# Patient Record
(demographics unavailable — no encounter records)

---

## 2024-12-08 NOTE — PHYSICAL EXAM
[Fully active, able to carry on all pre-disease performance without restriction] : Status 0 - Fully active, able to carry on all pre-disease performance without restriction [Normal] : affect appropriate [de-identified] : patient declines breast exam today

## 2024-12-08 NOTE — HISTORY OF PRESENT ILLNESS
[de-identified] : Patient is a 53 year old female here today for initial consultation of breast cancer, initially diagnosed 2019. Left upper outer quadrant lumpectomy conducted at [facility] in [place] and surgical pathology dated 11/15/2019 shows invasive lobular carcinoma of left breast, histologic grade II, tumor 1.1 mm - unifocal, no DCIS, no lymph node involvement. ER >95%, VA > 95%, HER2 negative, Ki67 <5%. MRI conducted on 2019 showed two areas of enhancement - one irregularly marginated and suspicious. Patient seen by Dr. Heath. Left breast US JESSICA seed placement done on 2020. Seen on 2020 for erythema and tenderness at site s/p seed placement, concerns for infection. Patient prescribed cephalexin, stopped taking on 2020. Underwent a secondary lumpectomy and sentinel lymph node biopsy on 2020 - 3 lymph nodes negative.  : 5 Para: 3 Age of onset of menarche: 12 LMP: 2019 oral contraceptive use from 5420-4986, denies HRT  Last bone density scan: denies  father with melanoma otherwise no family history of oncologic or hematologic disorders.  Patient has a smoking history since age 16, on and off, 15 year gap from 6912-7745. Current smoker 3-5 cigarettes/day. Drinks alcohol moderately 2-3 times/week. [de-identified] : Patient seen and examined today for routine follow up for the management of breast cancer. She remains on Zoladex and exemestane. She notes hotflashes and decreased libido with Exemestane use. Her symptoms have remained the same. She is due for another foot surgery but does not have scheduled yet. Follows with psych and added wellbutrin. Notes brain fog and sometimes blurry vision.   Mammo/Sono: 3/2024 BIRADS 2 due 3/2025 CNY: due 2026  MRI/MRCP 9/2023 Stable 4 mm pancreatic cystic lesion since 05/10/2021.Continued surveillance imaging recommended with follow-up MRI/MRCP in 2 years. DEXA: 12/2022 due 12/2024 CT Chest LungRads 2 stable 3mm nodule small hiatal hernia and esophageal wall thickening  MRI L spine No evidence of osseous metastasis in the lumbar spine.Grade 1 anterolisthesis L4-5 with resultant moderate spinal canal stenosis.

## 2024-12-08 NOTE — PHYSICAL EXAM
[Fully active, able to carry on all pre-disease performance without restriction] : Status 0 - Fully active, able to carry on all pre-disease performance without restriction [Normal] : affect appropriate [de-identified] : patient declines breast exam today

## 2024-12-08 NOTE — ASSESSMENT
[FreeTextEntry1] : #Breast Cancer - ILC of L breast ER+ (95%)/ FL+ (95%), HER2-, Ki 67 <5% s/p lumpectomy and SLN biopsy (T1b, N0, Mx) - Stage IA. - We reviewed the diagnosis, prognosis, and natural history of ILC, ER+ (95%)/ FL+ (95%), HER2- - We have reviewed her previous pathology and radiology reports. - We have reviewed the NCCN guidelines and patient expresses understanding and would like to proceed with the below plan. - Genetics - VUS PALB2. - She is s/p lumpectomy. - Oncotype 17 - At time she was 50 yo, Discussed that there is ~1.6% benefit of adding chemo in addition to endocrine therapy. Decided not to pursue chemo - s/p RT - Ovarian suppression with zoladex 3.6 mg monthly. - FSH/LH and estradiol reviewed - ok for exemestane - On exemestane 25 mg PO daily (started 6/2020) take this with Ca++ 1200 mg PO q day and Vit D 800 IU daily to protect her bone health. - Weaned off bupropiron supposedly. Follow up Dr. Brandan Swenson of [facility] to discuss medications and support. [phone]. If psychiatric health is compromised due to zoladex and exemestane can try tamoxifen. - 11/6/2020 - mammo - new architectural changes in the L breast cw breast conservation therapy. repeat diagnostic mammo/sono needed in 6 months (5/2021) No mammo or sonographic findings suspicious for malignancy in either breast - Does not want oopherectomy - DEXA - L spine T score 0.3, Hip T score 0.3, Femoral neck T score = -0.4 - 5/3/2021 - mammo reviewed - post treatment changes in L breast no new or recurrent malignancy of left breast. Bilateral diagnostic mammo 11/2021 - 8/9/2021- continues on zoladex and exemestane. vs and labs reviewed - 9/21/2021 - continues on zoladex and exemestane. vs and labs reviewed. remains premenopausal. bilateral diagnostic mammo/sono due for November. Names given for Dr. Mcpherson. - 10/18/2021 - vs and labs reviewed. continues on zoladex and exemestane. due for mammo/us in Nov - 11/16/2021 continue zoladex and exemestane, ammo/US scheduled 11/23/2021 - 12/14/2021 continue zoladex and exemestane, mammo/US reviewed- no suspicious findings, repeat 12 mos - 1/12/2022 - vs and labs reviewed. zoladex today. continue exemestane, 11/2022 - due for mammo/sono again. needs to follow up with Dr. Ramirez/Rojas. PE benign today - 3/16/2022 vs and labs reviewed, zoladex today. conitnue exemestane - 4/25/22 - reviewed hospitalization course with patient. vs and labs reviewed. zoladex today. will resume exemestane - held since 4/4. Will scan CT C/A/P to assess cancer status given new diagnosis of PEs. November 2022 - 5/24/2022 vs and labs reviewed; pending CT C (angio) A/P and NM - 6/21/2022 vs and labs reviewed; Dr. Spangler's note reviewed; NM scheduled for 6/24/2022 - 7/19/2022 vs and CBC reviewed; continue exemestane + monthly zoladex Recent imaging Reviewed: CTA- no discrete intrinsic filling defect is identified within the main right or left, lobar or proximal segmental pulmonary arteries to suggest central pulmonary emboli CT A/P- no evidence of suspicious mass or metastatic disease within the abdomen or pelvis NM- no scintigraphic evidence of bony metastatic disease - 8/17/22 - vs reviewed. labs drawn in office today. cbc wnl including wbc, hgb, plt. Had fall and oozing from staple site. will decrease eliquis to 2.5 mg BID temporarily for 2 weeks. She has been on treatment dose since 4/2022. She is not getting her period however the most recent FSH and LH are not in postmenopausal range. Estradiol <5. Will discuss with GYN. for now will continue with zoledex and exemestane. Tamoxifen is not a good option given history of blood clots. - 9/19/2022 vs and CBC reviewed, continue exemestane; mammo/US ordered; DEXA ordered-due 12/2022 - 10/17/2022 vs and CBC reviewed; WBC 4.10, hgb 13.8, 329; mammo and DEXA scheduled 12/6/2022 - 11/14/2022 vs and CBC reviewed; WBC 5.6, hgb 14, plt 320; continue exemestane + zoladex hormones last 10/17 estradiol 7, FSH 8.5, LH 0.4 - 12/12/22 - vs reviewed. labs drawn in office today. cbc within normal limits, cmp wnl. continue exemestane and zoladex. Reviewed mammo/us - no evidence of malignancy. DEXA 12/22 - reviewed. Osteopenia of hip. continue ca++ and vit D - 1/9/2023 vs and CBC reviewed; WBC 5.9, hgb 13.6, plt 323 continue exemestane + monthly zoladex injections; hormones last est <5, FSH 9.2, LH < 0.3 discussion about potentially trialing a different AI due to patient's symptoms - 2/6/2023 vs and CBC reviewed; WBC 4.2, hgb 13.6, plt 319; proceed with zoladex and continue PO exemestane - 3/6/2023 - vs reviewed. labs drawn in office today. wbc, hgb and plts wnl. continue with zoladex and exemestane, mammo from 12/22 reviewed. need repeat mammo/sono - 12/23 - 5/30/23 - vs reviewed. labs drawn in office today. wbc, hgb and plts wnl. continue with zoladex and exemestane, patient will decide over next few months if she wants to change regimen or pursue oophorectomy, need repeat mammo/sono - 12/23 - 8/28/23 vs and CBC reviewed; WBC 4.80, hgb 13.3, plt 337. Repeat hormones today. Continue exemestane and Zoladex monthly. Deciding about oopherectomy. Due for mammo/sono 12/2023, will order at next visit. - 11/30/23 vs and CBC reviewed; WBC 4.07, hgb 13.9, plt 369. Hormones are in postmenopausal range with slight fluctutations. Continue exemestane and Zoldex. Reviewed that she has been on Zoladex about 3 years with standard 2-5 years. If we trial off would need to monitor hormones. Does not want oopherectomy at this time. proceed with Zoladex today for now.  - 3/6/24 vs and CBC reviewed; WBC 4.57, hgb 13.9, plt 328. Continue Zoladex (overdue) and exemestane. Still thinking about oopherectomy. Zoladex for 2-5 years she has been on for about  3 years, can consider monitoring hormone levels off it, but will need to come monthly to check estradiol, FSH LH and if spike would restart. Mammo/sono 3/4/24 reviewed BIRADS 2 - 5/24 - vs and labs reviewed. labs drawn in office today. cbc wnl. continue zoladex and exemestane (will stop 6/2025). complains of back pain - will check MRI of L spine given discomfort also down legs. Mammo/sono 3/25.  - 9/2024 vs and labs reviewed; continue zoladex and exemestane. Did not go for L spine MRI. Will get scheduled today. Patient asked about estroven for post menoapausal s/s and losing weight. REviewed main ingredient is black cohash and recommend against due to estrogenergic effects. Offered weight management program  - 11/2024 vs and labs reviewed; continue zoladex and exemestane. Mammo due 3/2025notes some brain fog nad intermitent changes to vision - rec optho. Can be due to AI, holding vs monitor vs MRI   #Fatigue  - check irons b12 tfts   #back pain - Check MRI L spine - s/p MRI revealing No evidence of osseous metastasis in the lumbar spine.Grade 1 anterolisthesis L4-5 with resultant moderate spinal canal stenosis. can follow with ortho   #Hot Flashes - On Effexor  #Joint pains - Likely 2/2 zoladex. This may worsen with AI. - Given she has been on Zoladex for 3 years and standard is 2-5 years, can trial off and monitor hormones. Will proceed with Zoladex today and at next visits will discuss holding and monitoring hormones   #Neuropathy - Offered gabapentin - does not want to take at this time. can be exemestane in <1% of patients.  #Pancreatic Cysts - Two small up to 4 mm cysts in the pancreas of unlikely significance, due for 2 yr f/u MRI /MRCP to document stability - s/p follow up with GI Dr. Walker - s/p MRI/MRCP 9/2023 revealing stable 4 mm pancreatic cystic lesion since 05/10/2021.Continued surveillance imaging recommended with follow-up MRI/MRCP in 2 years. 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 due 9/2025 - Mammo/Sono 3/2024 - Reminded to follow up with GYN - EGD/[facility] 2021 with Dr. Walker, due 2026 - of note she had LDCT revealing hiatal hernia and esophageal wall thickeneing - rec follow up with GI may need EGD  - Stil thinking about oopherectomy. For now continuing Zoladex and will think about d/c and monitor hormone levels   #Mental Health - Seeing psychiatry - Remains effexor, adderall, xanax PRN  #Weight gain - 5/26/23 has remained fairly stable over past few visits.  #Chronic Cough - Smoking history - Follows with pulm Dr. Salamanca and Dr. Dallas, last seen 8/2/23 - s/p LDCT 8/2023 reviewed since June 24, 2022; No new or enlarging pulmonary nodules. - due now ordered 9/2024 - LungRads 2 - stable 3mm nodule   #PE - Unprovoked - Elevated cardiolipin and LA - Tolerating Eliquis 5mg BID  - Repeat hypercoag work up 2/2023  - 1/9/2023 hypercoag work up pending - 2/6/2023 LA positive with silica slcotting time 1.4, cardiolipins remain elevated, patient to continue on eliquis - Phone conversation with Dr. Guidry Podiatrist (T [phone] F [phone]) regarding patient's upcoming foot surgery, the patient is cleared to proceed with upcoming procedure. She will require bridging of Lovenox prior to the procedure. Plan for 1 injection Lovenox 80mg twice daily Tuesday 2/7, Wednesday 2/8, and Thursday 2/9; no lovenox injection or eliquis Friday 2/10 the day of procedure. The patient can resume eliquis 5mg PO BID Saturday 2/11/2023 - Continue Eliquis 5mg BID - 11/30/23 patient sought out another opinion with surgeon in [place] she does not recall the name of the surgeon and will get me this information. She is scheduled for L achiles tendon debridement and repair of calcenael Ex ostectomy and gastroc resection as needed, felxor hallicus longus tendon transfer. patient states this is ambulatory. She will require bridging of Lovenox prior to the procedure. Plan for 1 injection Lovenox 80mg twice daily Monday 12/18, Tuesday 12/19, and morning of Weds 12/20 no lovenox injection or eliquis Thursday 12/21 the day of procedure. The patient can resume eliquis 5mg PO BID Friday 12/22/2023 - 5/24 - need foot surgery again after labor day - will transition to lovenox prior to procedure - need another foot surgery, not scheduled advisesd to call when she knows   #Vertebral Artery dissection - Follows with [facility] at [facility], has follow up 9/2022 with Dr. Mcgee - Multiple requests made for medical records  #Orthopedic Surgery - Has ongoing pain to the b/l feet and ankle s/p previous tendon surgery. Pending another surgery/second opinion - As above recommendations for Lovenox  - She is s/p recent achilles surgery with Dr. Horton at [facility] - will need another surgery to R foot not scheduled   #Health Maintenance  - Mammo/Sono: 3/2024 BIRADS 2 due 3/2025  - CNY: due 2026  - MRI/MRCP 9/2023 Stable 4 mm pancreatic cystic lesion since 05/10/2021.Continued surveillance imaging recommended with follow-up MRI/MRCP in 2 years. Due 9/2025 - DEXA: 12/2022, due 12/2024 ordered  - CT Chest 2024 LungRads 2   LAB in 4 weeks with possible Zoladex LAB in 8 weeks with possible Zoladex RTC in 12 weeks with CBC with diff, CMP, vit D, ESR/CRP, Mag, estradiol, LH, FSH.

## 2024-12-08 NOTE — ASSESSMENT
[FreeTextEntry1] : #Breast Cancer - ILC of L breast ER+ (95%)/ LA+ (95%), HER2-, Ki 67 <5% s/p lumpectomy and SLN biopsy (T1b, N0, Mx) - Stage IA. - We reviewed the diagnosis, prognosis, and natural history of ILC, ER+ (95%)/ LA+ (95%), HER2- - We have reviewed her previous pathology and radiology reports. - We have reviewed the NCCN guidelines and patient expresses understanding and would like to proceed with the below plan. - Genetics - VUS PALB2. - She is s/p lumpectomy. - Oncotype 17 - At time she was 50 yo, Discussed that there is ~1.6% benefit of adding chemo in addition to endocrine therapy. Decided not to pursue chemo - s/p RT - Ovarian suppression with zoladex 3.6 mg monthly. - FSH/LH and estradiol reviewed - ok for exemestane - On exemestane 25 mg PO daily (started 6/2020) take this with Ca++ 1200 mg PO q day and Vit D 800 IU daily to protect her bone health. - Weaned off bupropiron supposedly. Follow up Dr. Brandan Swenson of [facility] to discuss medications and support. [phone]. If psychiatric health is compromised due to zoladex and exemestane can try tamoxifen. - 11/6/2020 - mammo - new architectural changes in the L breast cw breast conservation therapy. repeat diagnostic mammo/sono needed in 6 months (5/2021) No mammo or sonographic findings suspicious for malignancy in either breast - Does not want oopherectomy - DEXA - L spine T score 0.3, Hip T score 0.3, Femoral neck T score = -0.4 - 5/3/2021 - mammo reviewed - post treatment changes in L breast no new or recurrent malignancy of left breast. Bilateral diagnostic mammo 11/2021 - 8/9/2021- continues on zoladex and exemestane. vs and labs reviewed - 9/21/2021 - continues on zoladex and exemestane. vs and labs reviewed. remains premenopausal. bilateral diagnostic mammo/sono due for November. Names given for Dr. Mcpherson. - 10/18/2021 - vs and labs reviewed. continues on zoladex and exemestane. due for mammo/us in Nov - 11/16/2021 continue zoladex and exemestane, ammo/US scheduled 11/23/2021 - 12/14/2021 continue zoladex and exemestane, mammo/US reviewed- no suspicious findings, repeat 12 mos - 1/12/2022 - vs and labs reviewed. zoladex today. continue exemestane, 11/2022 - due for mammo/sono again. needs to follow up with Dr. Ramirez/Rojas. PE benign today - 3/16/2022 vs and labs reviewed, zoladex today. conitnue exemestane - 4/25/22 - reviewed hospitalization course with patient. vs and labs reviewed. zoladex today. will resume exemestane - held since 4/4. Will scan CT C/A/P to assess cancer status given new diagnosis of PEs. November 2022 - 5/24/2022 vs and labs reviewed; pending CT C (angio) A/P and NM - 6/21/2022 vs and labs reviewed; Dr. Spangler's note reviewed; NM scheduled for 6/24/2022 - 7/19/2022 vs and CBC reviewed; continue exemestane + monthly zoladex Recent imaging Reviewed: CTA- no discrete intrinsic filling defect is identified within the main right or left, lobar or proximal segmental pulmonary arteries to suggest central pulmonary emboli CT A/P- no evidence of suspicious mass or metastatic disease within the abdomen or pelvis NM- no scintigraphic evidence of bony metastatic disease - 8/17/22 - vs reviewed. labs drawn in office today. cbc wnl including wbc, hgb, plt. Had fall and oozing from staple site. will decrease eliquis to 2.5 mg BID temporarily for 2 weeks. She has been on treatment dose since 4/2022. She is not getting her period however the most recent FSH and LH are not in postmenopausal range. Estradiol <5. Will discuss with GYN. for now will continue with zoledex and exemestane. Tamoxifen is not a good option given history of blood clots. - 9/19/2022 vs and CBC reviewed, continue exemestane; mammo/US ordered; DEXA ordered-due 12/2022 - 10/17/2022 vs and CBC reviewed; WBC 4.10, hgb 13.8, 329; mammo and DEXA scheduled 12/6/2022 - 11/14/2022 vs and CBC reviewed; WBC 5.6, hgb 14, plt 320; continue exemestane + zoladex hormones last 10/17 estradiol 7, FSH 8.5, LH 0.4 - 12/12/22 - vs reviewed. labs drawn in office today. cbc within normal limits, cmp wnl. continue exemestane and zoladex. Reviewed mammo/us - no evidence of malignancy. DEXA 12/22 - reviewed. Osteopenia of hip. continue ca++ and vit D - 1/9/2023 vs and CBC reviewed; WBC 5.9, hgb 13.6, plt 323 continue exemestane + monthly zoladex injections; hormones last est <5, FSH 9.2, LH < 0.3 discussion about potentially trialing a different AI due to patient's symptoms - 2/6/2023 vs and CBC reviewed; WBC 4.2, hgb 13.6, plt 319; proceed with zoladex and continue PO exemestane - 3/6/2023 - vs reviewed. labs drawn in office today. wbc, hgb and plts wnl. continue with zoladex and exemestane, mammo from 12/22 reviewed. need repeat mammo/sono - 12/23 - 5/30/23 - vs reviewed. labs drawn in office today. wbc, hgb and plts wnl. continue with zoladex and exemestane, patient will decide over next few months if she wants to change regimen or pursue oophorectomy, need repeat mammo/sono - 12/23 - 8/28/23 vs and CBC reviewed; WBC 4.80, hgb 13.3, plt 337. Repeat hormones today. Continue exemestane and Zoladex monthly. Deciding about oopherectomy. Due for mammo/sono 12/2023, will order at next visit. - 11/30/23 vs and CBC reviewed; WBC 4.07, hgb 13.9, plt 369. Hormones are in postmenopausal range with slight fluctutations. Continue exemestane and Zoldex. Reviewed that she has been on Zoladex about 3 years with standard 2-5 years. If we trial off would need to monitor hormones. Does not want oopherectomy at this time. proceed with Zoladex today for now.  - 3/6/24 vs and CBC reviewed; WBC 4.57, hgb 13.9, plt 328. Continue Zoladex (overdue) and exemestane. Still thinking about oopherectomy. Zoladex for 2-5 years she has been on for about  3 years, can consider monitoring hormone levels off it, but will need to come monthly to check estradiol, FSH LH and if spike would restart. Mammo/sono 3/4/24 reviewed BIRADS 2 - 5/24 - vs and labs reviewed. labs drawn in office today. cbc wnl. continue zoladex and exemestane (will stop 6/2025). complains of back pain - will check MRI of L spine given discomfort also down legs. Mammo/sono 3/25.  - 9/2024 vs and labs reviewed; continue zoladex and exemestane. Did not go for L spine MRI. Will get scheduled today. Patient asked about estroven for post menoapausal s/s and losing weight. REviewed main ingredient is black cohash and recommend against due to estrogenergic effects. Offered weight management program  - 11/2024 vs and labs reviewed; continue zoladex and exemestane. Mammo due 3/2025notes some brain fog nad intermitent changes to vision - rec optho. Can be due to AI, holding vs monitor vs MRI   #Fatigue  - check irons b12 tfts   #back pain - Check MRI L spine - s/p MRI revealing No evidence of osseous metastasis in the lumbar spine.Grade 1 anterolisthesis L4-5 with resultant moderate spinal canal stenosis. can follow with ortho   #Hot Flashes - On Effexor  #Joint pains - Likely 2/2 zoladex. This may worsen with AI. - Given she has been on Zoladex for 3 years and standard is 2-5 years, can trial off and monitor hormones. Will proceed with Zoladex today and at next visits will discuss holding and monitoring hormones   #Neuropathy - Offered gabapentin - does not want to take at this time. can be exemestane in <1% of patients.  #Pancreatic Cysts - Two small up to 4 mm cysts in the pancreas of unlikely significance, due for 2 yr f/u MRI /MRCP to document stability - s/p follow up with GI Dr. Walker - s/p MRI/MRCP 9/2023 revealing stable 4 mm pancreatic cystic lesion since 05/10/2021.Continued surveillance imaging recommended with follow-up MRI/MRCP in 2 years. 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 due 9/2025 - Mammo/Sono 3/2024 - Reminded to follow up with GYN - EGD/CNY 2021 with Dr. Walker, due 2026 - of note she had LDCT revealing hiatal hernia and esophageal wall thickeneing - rec follow up with GI may need EGD  - Stil thinking about oopherectomy. For now continuing Zoladex and will think about d/c and monitor hormone levels   #Mental Health - Seeing psychiatry - Remains effexor, adderall, xanax PRN  #Weight gain - 5/26/23 has remained fairly stable over past few visits.  #Chronic Cough - Smoking history - Follows with pulm Dr. Salamanca and Dr. Dallas, last seen 8/2/23 - s/p LDCT 8/2023 reviewed since June 24, 2022; No new or enlarging pulmonary nodules. - due now ordered 9/2024 - LungRads 2 - stable 3mm nodule   #PE - Unprovoked - Elevated cardiolipin and LA - Tolerating Eliquis 5mg BID  - Repeat hypercoag work up 2/2023  - 1/9/2023 hypercoag work up pending - 2/6/2023 LA positive with silica slcotting time 1.4, cardiolipins remain elevated, patient to continue on eliquis - Phone conversation with Dr. Guidry Podiatrist (T [phone] F [phone]) regarding patient's upcoming foot surgery, the patient is cleared to proceed with upcoming procedure. She will require bridging of Lovenox prior to the procedure. Plan for 1 injection Lovenox 80mg twice daily Tuesday 2/7, Wednesday 2/8, and Thursday 2/9; no lovenox injection or eliquis Friday 2/10 the day of procedure. The patient can resume eliquis 5mg PO BID Saturday 2/11/2023 - Continue Eliquis 5mg BID - 11/30/23 patient sought out another opinion with surgeon in [place] she does not recall the name of the surgeon and will get me this information. She is scheduled for L achiles tendon debridement and repair of calcenael Ex ostectomy and gastroc resection as needed, felxor hallicus longus tendon transfer. patient states this is ambulatory. She will require bridging of Lovenox prior to the procedure. Plan for 1 injection Lovenox 80mg twice daily Monday 12/18, Tuesday 12/19, and morning of Weds 12/20 no lovenox injection or eliquis Thursday 12/21 the day of procedure. The patient can resume eliquis 5mg PO BID Friday 12/22/2023 - 5/24 - need foot surgery again after labor day - will transition to lovenox prior to procedure - need another foot surgery, not scheduled advisesd to call when she knows   #Vertebral Artery dissection - Follows with [facility] at [facility], has follow up 9/2022 with Dr. Mcgee - Multiple requests made for medical records  #Orthopedic Surgery - Has ongoing pain to the b/l feet and ankle s/p previous tendon surgery. Pending another surgery/second opinion - As above recommendations for Lovenox  - She is s/p recent achilles surgery with Dr. Horton at [facility] - will need another surgery to R foot not scheduled   #Health Maintenance  - Mammo/Sono: 3/2024 BIRADS 2 due 3/2025  - CNY: due 2026  - MRI/MRCP 9/2023 Stable 4 mm pancreatic cystic lesion since 05/10/2021.Continued surveillance imaging recommended with follow-up MRI/MRCP in 2 years. Due 9/2025 - DEXA: 12/2022, due 12/2024 ordered  - CT Chest 2024 LungRads 2   LAB in 4 weeks with possible Zoladex LAB in 8 weeks with possible Zoladex RTC in 12 weeks with CBC with diff, CMP, vit D, ESR/CRP, Mag, estradiol, LH, FSH.

## 2024-12-08 NOTE — HISTORY OF PRESENT ILLNESS
[de-identified] : Patient is a 53 year old female here today for initial consultation of breast cancer, initially diagnosed 2019. Left upper outer quadrant lumpectomy conducted at [facility] in [place] and surgical pathology dated 11/15/2019 shows invasive lobular carcinoma of left breast, histologic grade II, tumor 1.1 mm - unifocal, no DCIS, no lymph node involvement. ER >95%, DC > 95%, HER2 negative, Ki67 <5%. MRI conducted on 2019 showed two areas of enhancement - one irregularly marginated and suspicious. Patient seen by Dr. Heath. Left breast US JESSICA seed placement done on 2020. Seen on 2020 for erythema and tenderness at site s/p seed placement, concerns for infection. Patient prescribed cephalexin, stopped taking on 2020. Underwent a secondary lumpectomy and sentinel lymph node biopsy on 2020 - 3 lymph nodes negative.  : 5 Para: 3 Age of onset of menarche: 12 LMP: 2019 oral contraceptive use from 3731-1789, denies HRT  Last bone density scan: denies  father with melanoma otherwise no family history of oncologic or hematologic disorders.  Patient has a smoking history since age 16, on and off, 15 year gap from [id number]. Current smoker 3-5 cigarettes/day. Drinks alcohol moderately 2-3 times/week. [de-identified] : Patient seen and examined today for routine follow up for the management of breast cancer. She remains on Zoladex and exemestane. She notes hotflashes and decreased libido with Exemestane use. Her symptoms have remained the same. She is due for another foot surgery but does not have scheduled yet. Follows with psych and added wellbutrin. Notes brain fog and sometimes blurry vision.   Mammo/Sono: 3/2024 BIRADS 2 due 3/2025 CNY: due 2026  MRI/MRCP 9/2023 Stable 4 mm pancreatic cystic lesion since 05/10/2021.Continued surveillance imaging recommended with follow-up MRI/MRCP in 2 years. DEXA: 12/2022 due 12/2024 CT Chest LungRads 2 stable 3mm nodule small hiatal hernia and esophageal wall thickening  MRI L spine No evidence of osseous metastasis in the lumbar spine.Grade 1 anterolisthesis L4-5 with resultant moderate spinal canal stenosis.

## 2024-12-08 NOTE — REVIEW OF SYSTEMS
[Night Sweats] : night sweats [Recent Change In Weight] : ~T recent weight change [Red Eyes] : red eyes [Wheezing] : wheezing [Cough] : cough [SOB on Exertion] : shortness of breath during exertion [Joint Pain] : joint pain [Joint Stiffness] : joint stiffness [Hot Flashes] : hot flashes [Negative] : Allergic/Immunologic [Fever] : no fever [Chills] : no chills [Fatigue] : no fatigue [Anxiety] : no anxiety [FreeTextEntry2] : brain fog and intermittent changes to vision  [FreeTextEntry6] : SOB on exertion worse due to allergies [FreeTextEntry9] : back pain, foot pain

## 2024-12-08 NOTE — PHYSICAL EXAM
[Fully active, able to carry on all pre-disease performance without restriction] : Status 0 - Fully active, able to carry on all pre-disease performance without restriction [Normal] : affect appropriate [de-identified] : patient declines breast exam today

## 2024-12-08 NOTE — HISTORY OF PRESENT ILLNESS
[de-identified] : Patient is a 53 year old female here today for initial consultation of breast cancer, initially diagnosed 2019. Left upper outer quadrant lumpectomy conducted at [facility] in [place] and surgical pathology dated 11/15/2019 shows invasive lobular carcinoma of left breast, histologic grade II, tumor 1.1 mm - unifocal, no DCIS, no lymph node involvement. ER >95%, WI > 95%, HER2 negative, Ki67 <5%. MRI conducted on 2019 showed two areas of enhancement - one irregularly marginated and suspicious. Patient seen by Dr. Heath. Left breast US JESSICA seed placement done on 2020. Seen on 2020 for erythema and tenderness at site s/p seed placement, concerns for infection. Patient prescribed cephalexin, stopped taking on 2020. Underwent a secondary lumpectomy and sentinel lymph node biopsy on 2020 - 3 lymph nodes negative.  : 5 Para: 3 Age of onset of menarche: 12 LMP: 2019 oral contraceptive use from 9511-6748, denies HRT  Last bone density scan: denies  father with melanoma otherwise no family history of oncologic or hematologic disorders.  Patient has a smoking history since age 16, on and off, 15 year gap from [id number]. Current smoker 3-5 cigarettes/day. Drinks alcohol moderately 2-3 times/week. [de-identified] : Patient seen and examined today for routine follow up for the management of breast cancer. She remains on Zoladex and exemestane. She notes hotflashes and decreased libido with Exemestane use. Her symptoms have remained the same. She is due for another foot surgery but does not have scheduled yet. Follows with psych and added wellbutrin. Notes brain fog and sometimes blurry vision.   Mammo/Sono: 3/2024 BIRADS 2 due 3/2025 CNY: due 2026  MRI/MRCP 9/2023 Stable 4 mm pancreatic cystic lesion since 05/10/2021.Continued surveillance imaging recommended with follow-up MRI/MRCP in 2 years. DEXA: 12/2022 due 12/2024 CT Chest LungRads 2 stable 3mm nodule small hiatal hernia and esophageal wall thickening  MRI L spine No evidence of osseous metastasis in the lumbar spine.Grade 1 anterolisthesis L4-5 with resultant moderate spinal canal stenosis.

## 2025-01-06 NOTE — HISTORY OF PRESENT ILLNESS
[Patient reported PAP Smear was normal] : Patient reported PAP Smear was normal [Patient reported colonoscopy was normal] : Patient reported colonoscopy was normal [FreeTextEntry1] : 55 y/o  postmenopausal since  presents for annual GYN exam.  History of  x 2 and C/S X 1.  SAB treated with medicine and D&C.  S/P lumpectomy for invasive lobular carcinoma of left breast ER+/AK+/HER2 negative diagnosed 2019. Treated with radiation. Taking Zoladex and Exemestane. Followed by oncology and breast surgery.  Monogamous with long term partner. Sexually active with partner. Lower libido since on medication for breast ca.  C/o brain fog and sometimes blurry vision since starting medication. No periods since starting medication.  Requesting STD testing.  Denies FH of ca of ovary, colon, breast or uterus. [Mammogramdate] : 3/4/24 [TextBox_19] : BI-RADS 2D [BreastSonogramDate] : 3/4/24 [TextBox_25] : BI-RADS 2 [PapSmeardate] : 12/23 [BoneDensityDate] : 12/6/22 [TextBox_37] : Normal spine/ osteopenia hip [ColonoscopyDate] : 01/21

## 2025-01-06 NOTE — PHYSICAL EXAM
[Chaperone Present] : A chaperone was present in the examining room during all aspects of the physical examination [Appropriately responsive] : appropriately responsive [Alert] : alert [No Acute Distress] : no acute distress [Soft] : soft [Non-tender] : non-tender [Non-distended] : non-distended [No HSM] : No HSM [No Mass] : no mass [Oriented x3] : oriented x3 [No Lesions] : no lesions  [Labia Majora] : normal [Labia Minora] : normal [Pink Rugae] : pink rugae [Normal] : normal [Uterine Adnexae] : normal [99037] : A chaperone was present during the pelvic exam. [FreeTextEntry2] : [place] [facility] [FreeTextEntry6] : Breasts are symmetrical. No masses, tenderness or LAD bilaterally. No nipple discharge bilaterally.  [Tenderness] : nontender [Enlarged ___ wks] : not enlarged [FreeTextEntry5] : no CMT [FreeTextEntry8] : no adnexal masses or tenderness.

## 2025-01-06 NOTE — PHYSICAL EXAM
[Chaperone Present] : A chaperone was present in the examining room during all aspects of the physical examination [Appropriately responsive] : appropriately responsive [Alert] : alert [No Acute Distress] : no acute distress [Soft] : soft [Non-tender] : non-tender [Non-distended] : non-distended [No HSM] : No HSM [No Mass] : no mass [Oriented x3] : oriented x3 [No Lesions] : no lesions  [Labia Majora] : normal [Labia Minora] : normal [Pink Rugae] : pink rugae [Normal] : normal [Uterine Adnexae] : normal [[id number]] : A chaperone was present during the pelvic exam. [FreeTextEntry2] : [place] [facility] [FreeTextEntry6] : Breasts are symmetrical. No masses, tenderness or LAD bilaterally. No nipple discharge bilaterally.  [Tenderness] : nontender [Enlarged ___ wks] : not enlarged [FreeTextEntry5] : no CMT [FreeTextEntry8] : no adnexal masses or tenderness.

## 2025-01-06 NOTE — HISTORY OF PRESENT ILLNESS
[Patient reported PAP Smear was normal] : Patient reported PAP Smear was normal [Patient reported colonoscopy was normal] : Patient reported colonoscopy was normal [FreeTextEntry1] : 55 y/o  postmenopausal since  presents for annual GYN exam.  History of  x 2 and C/S X 1.  SAB treated with medicine and D&C.  S/P lumpectomy for invasive lobular carcinoma of left breast ER+/GA+/HER2 negative diagnosed 2019. Treated with radiation. Taking Zoladex and Exemestane. Followed by oncology and breast surgery.  Monogamous with long term partner. Sexually active with partner. Lower libido since on medication for breast ca.  C/o brain fog and sometimes blurry vision since starting medication. No periods since starting medication.  Requesting STD testing.  Denies FH of ca of ovary, colon, breast or uterus. [Mammogramdate] : 3/4/24 [TextBox_19] : BI-RADS 2D [BreastSonogramDate] : 3/4/24 [TextBox_25] : BI-RADS 2 [PapSmeardate] : 12/23 [BoneDensityDate] : 12/6/22 [TextBox_37] : Normal spine/ osteopenia hip [ColonoscopyDate] : 01/21

## 2025-03-06 NOTE — ASSESSMENT
[FreeTextEntry1] : Patient has clinically recovered from her bout of bilateral patchy pneumonia.  I will recommend she repeat her CAT scan of the chest in early April as a follow-up.  She may continue to use nebulized treatments on a as needed basis for cough.  She is to follow-up with me after the CAT scan is done.

## 2025-03-12 NOTE — END OF VISIT
Patient is scheduled for a colonoscopy with Dr calles on 02-23/ [facility] [place] [facility]. Please send Nulytely prep to patient's selected pharmacy selected during scheduling. [facility]       Thank you, GI Preadmit Surgery Scheduler 
Surgery is scheduled with Dr. Lamin Bucio at [facility] on [place]23.  
[Time Spent: ___ minutes] : I have spent [unfilled] minutes of time on the encounter which excludes teaching and separately reported services.

## 2025-03-19 NOTE — ASSESSMENT
[FreeTextEntry1] : #Breast Cancer - ILC of L breast ER+ (95%)/ WA+ (95%), HER2-, Ki 67 <5% s/p lumpectomy and SLN biopsy (T1b, N0, Mx) - Stage IA. - We reviewed the diagnosis, prognosis, and natural history of ILC, ER+ (95%)/ WA+ (95%), HER2- - We have reviewed her previous pathology and radiology reports. - We have reviewed the NCCN guidelines and patient expresses understanding and would like to proceed with the below plan. - Genetics - VUS PALB2. - She is s/p lumpectomy. - Oncotype 17 - At time she was 50 yo, Discussed that there is ~1.6% benefit of adding chemo in addition to endocrine therapy. Decided not to pursue chemo - s/p RT - Ovarian suppression with zoladex 3.6 mg monthly. - FSH/LH and estradiol reviewed - ok for exemestane - On exemestane 25 mg PO daily (started 6/2020) take this with Ca++ 1200 mg PO q day and Vit D 800 IU daily to protect her bone health. - Weaned off bupropiron supposedly. Follow up Dr. Brandan Swenson of [facility] to discuss medications and support. [phone]. If psychiatric health is compromised due to zoladex and exemestane can try tamoxifen. - 11/6/2020 - mammo - new architectural changes in the L breast cw breast conservation therapy. repeat diagnostic mammo/sono needed in 6 months (5/2021) No mammo or sonographic findings suspicious for malignancy in either breast - Does not want oopherectomy - DEXA - L spine T score 0.3, Hip T score 0.3, Femoral neck T score = -0.4 - 5/3/2021 - mammo reviewed - post treatment changes in L breast no new or recurrent malignancy of left breast. Bilateral diagnostic mammo 11/2021 - 8/9/2021- continues on zoladex and exemestane. vs and labs reviewed - 9/21/2021 - continues on zoladex and exemestane. vs and labs reviewed. remains premenopausal. bilateral diagnostic mammo/sono due for November. Names given for Dr. Mcpherson. - 10/18/2021 - vs and labs reviewed. continues on zoladex and exemestane. due for mammo/us in Nov - 11/16/2021 continue zoladex and exemestane, ammo/US scheduled 11/23/2021 - 12/14/2021 continue zoladex and exemestane, mammo/US reviewed- no suspicious findings, repeat 12 mos - 1/12/2022 - vs and labs reviewed. zoladex today. continue exemestane, 11/2022 - due for mammo/sono again. needs to follow up with Dr. Ramirez/Rojas. PE benign today - 3/16/2022 vs and labs reviewed, zoladex today. conitnue exemestane - 4/25/22 - reviewed hospitalization course with patient. vs and labs reviewed. zoladex today. will resume exemestane - held since 4/4. Will scan CT C/A/P to assess cancer status given new diagnosis of PEs. November 2022 - 5/24/2022 vs and labs reviewed; pending CT C (angio) A/P and NM - 6/21/2022 vs and labs reviewed; Dr. Spangler's note reviewed; NM scheduled for 6/24/2022 - 7/19/2022 vs and CBC reviewed; continue exemestane + monthly zoladex Recent imaging Reviewed: CTA- no discrete intrinsic filling defect is identified within the main right or left, lobar or proximal segmental pulmonary arteries to suggest central pulmonary emboli CT A/P- no evidence of suspicious mass or metastatic disease within the abdomen or pelvis NM- no scintigraphic evidence of bony metastatic disease - 8/17/22 - vs reviewed. labs drawn in office today. cbc wnl including wbc, hgb, plt. Had fall and oozing from staple site. will decrease eliquis to 2.5 mg BID temporarily for 2 weeks. She has been on treatment dose since 4/2022. She is not getting her period however the most recent FSH and LH are not in postmenopausal range. Estradiol <5. Will discuss with GYN. for now will continue with zoledex and exemestane. Tamoxifen is not a good option given history of blood clots. - 9/19/2022 vs and CBC reviewed, continue exemestane; mammo/US ordered; DEXA ordered-due 12/2022 - 10/17/2022 vs and CBC reviewed; WBC 4.10, hgb 13.8, 329; mammo and DEXA scheduled 12/6/2022 - 11/14/2022 vs and CBC reviewed; WBC 5.6, hgb 14, plt 320; continue exemestane + zoladex hormones last 10/17 estradiol 7, FSH 8.5, LH 0.4 - 12/12/22 - vs reviewed. labs drawn in office today. cbc within normal limits, cmp wnl. continue exemestane and zoladex. Reviewed mammo/us - no evidence of malignancy. DEXA 12/22 - reviewed. Osteopenia of hip. continue ca++ and vit D - 1/9/2023 vs and CBC reviewed; WBC 5.9, hgb 13.6, plt 323 continue exemestane + monthly zoladex injections; hormones last est <5, FSH 9.2, LH < 0.3 discussion about potentially trialing a different AI due to patient's symptoms - 2/6/2023 vs and CBC reviewed; WBC 4.2, hgb 13.6, plt 319; proceed with zoladex and continue PO exemestane - 3/6/2023 - vs reviewed. labs drawn in office today. wbc, hgb and plts wnl. continue with zoladex and exemestane, mammo from 12/22 reviewed. need repeat mammo/sono - 12/23 - 5/30/23 - vs reviewed. labs drawn in office today. wbc, hgb and plts wnl. continue with zoladex and exemestane, patient will decide over next few months if she wants to change regimen or pursue oophorectomy, need repeat mammo/sono - 12/23 - 8/28/23 vs and CBC reviewed; WBC 4.80, hgb 13.3, plt 337. Repeat hormones today. Continue exemestane and Zoladex monthly. Deciding about oopherectomy. Due for mammo/sono 12/2023, will order at next visit. - 11/30/23 vs and CBC reviewed; WBC 4.07, hgb 13.9, plt 369. Hormones are in postmenopausal range with slight fluctutations. Continue exemestane and Zoldex. Reviewed that she has been on Zoladex about 3 years with standard 2-5 years. If we trial off would need to monitor hormones. Does not want oopherectomy at this time. proceed with Zoladex today for now.  - 3/6/24 vs and CBC reviewed; WBC 4.57, hgb 13.9, plt 328. Continue Zoladex (overdue) and exemestane. Still thinking about oopherectomy. Zoladex for 2-5 years she has been on for about  3 years, can consider monitoring hormone levels off it, but will need to come monthly to check estradiol, FSH LH and if spike would restart. Mammo/sono 3/4/24 reviewed BIRADS 2 - 5/24 - vs and labs reviewed. labs drawn in office today. cbc wnl. continue zoladex and exemestane (will stop 6/2025). complains of back pain - will check MRI of L spine given discomfort also down legs. Mammo/sono 3/25.  - 9/2024 vs and labs reviewed; continue zoladex and exemestane. Did not go for L spine MRI. Will get scheduled today. Patient asked about estroven for post menoapausal s/s and losing weight. REviewed main ingredient is black cohash and recommend against due to estrogenergic effects. Offered weight management program  - 11/2024 vs and labs reviewed; continue zoladex and exemestane. Mammo due 3/2025notes some brain fog nad intermitent changes to vision - rec optho. Can be due to AI, holding vs monitor vs MRI  3/19/25 - vs and labs reviewed. labs drawn in office today. wbc, hgb and plts wnl. Mammo/sono today.  she wants to consider coming off of zoladex. will assess hormones and make decision but she will need to come for labs to ensure no elevation of estradiol  #Fatigue  - check irons b12 tfts   #back pain - s/p MRI revealing No evidence of osseous metastasis in the lumbar spine.Grade 1 anterolisthesis L4-5 with resultant moderate spinal canal stenosis. can follow with ortho   #Hot Flashes - weaning off Effexor  #Joint pains - Likely 2/2 zoladex. This may worsen with AI. - Given she has been on Zoladex for 3 years and standard is 2-5 years, can trial off and monitor hormones. Will proceed with Zoladex today and at next visits will discuss holding and monitoring hormones   #Neuropathy - Offered gabapentin - does not want to take at this time. can be exemestane in <1% of patients.  #Pancreatic Cysts - Two small up to 4 mm cysts in the pancreas of unlikely significance, due for 2 yr f/u MRI /MRCP to document stability - s/p follow up with GI Dr. Walker - s/p MRI/MRCP 9/2023 revealing stable 4 mm pancreatic cystic lesion since 05/10/2021.Continued surveillance imaging recommended with follow-up MRI/MRCP in 2 years. 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 due 9/2025 - Mammo/Sono 3/2024. due now - follow up with GYN - 1/2025 - EGD/[facility] 2021 with Dr. Walker, due 2026 - of note she had LDCT revealing hiatal hernia and esophageal wall thickeneing - rec follow up with GI may need EGD  - Stil thinking about oopherectomy. For now continuing Zoladex and will think about d/c and monitor hormone levels  - DEXA - 1/2025 - Osteopenia femoral neck  #Mental Health - Seeing psychiatry - Remains effexor, adderall, xanax PRN  #Weight gain - 5/26/23 has remained fairly stable over past few visits.  #Chronic Cough - Smoking history - Follows with pulm Dr. Salamanca and Dr. Dallas, last seen 8/2/23 - s/p LDCT 8/2023 reviewed since June 24, 2022; No new or enlarging pulmonary nodules. - due now ordered 9/2024 - LungRads 2 - stable 3mm nodule  - follows with Dr. Salamanca -reviewed note from 3/6/25 -  bout of bilateral patchy pneumonia. I will recommend she repeat her CAT scan of the chest in early April as a follow-up. She may continue to use nebulized treatments on a as needed basis for cough. She is to follow-up with me after the CAT scan is done  #PE - Unprovoked - Elevated cardiolipin and LA - Tolerating Eliquis 5mg BID  - Repeat hypercoag work up 2/2023  - 1/9/2023 hypercoag work up pending - 2/6/2023 LA positive with silica slcotting time 1.4, cardiolipins remain elevated, patient to continue on eliquis - Phone conversation with Dr. Guidry Podiatrist (T [phone] F [phone]) regarding patient's upcoming foot surgery, the patient is cleared to proceed with upcoming procedure. She will require bridging of Lovenox prior to the procedure. Plan for 1 injection Lovenox 80mg twice daily Tuesday 2/7, Wednesday 2/8, and Thursday 2/9; no lovenox injection or eliquis Friday 2/10 the day of procedure. The patient can resume eliquis 5mg PO BID Saturday 2/11/2023 - Continue Eliquis 5mg BID - 11/30/23 patient sought out another opinion with surgeon in [place] she does not recall the name of the surgeon and will get me this information. She is scheduled for L achiles tendon debridement and repair of calcenael Ex ostectomy and gastroc resection as needed, felxor hallicus longus tendon transfer. patient states this is ambulatory. She will require bridging of Lovenox prior to the procedure. Plan for 1 injection Lovenox 80mg twice daily Monday 12/18, Tuesday 12/19, and morning of Weds 12/20 no lovenox injection or eliquis Thursday 12/21 the day of procedure. The patient can resume eliquis 5mg PO BID Friday 12/22/2023 - 5/24 - need foot surgery again after labor day - will transition to lovenox prior to procedure - need another foot surgery, not scheduled advisesd to call when she knows   #Vertebral Artery dissection - Follows with [facility] at [facility], has follow up 9/2022 with Dr. Mcgee - Multiple requests made for medical records  #Orthopedic Surgery - Has ongoing pain to the b/l feet and ankle s/p previous tendon surgery. Pending another surgery/second opinion - As above recommendations for Lovenox  - She is s/p recent achilles surgery with Dr. Horton at [facility] - will need another surgery to R foot not scheduled   #Health Maintenance  - Mammo/Sono: 3/2024 BIRADS 2 due 3/2025  - CNY: due 2026  - MRI/MRCP 9/2023 Stable 4 mm pancreatic cystic lesion since 05/10/2021.Continued surveillance imaging recommended with follow-up MRI/MRCP in 2 years. Due 9/2025 - DEXA: 12/2022, due 12/2024 ordered  - CT Chest 2024 LungRads 2   LAB in 4 weeks with possible Zoladex LAB in 8 weeks with possible Zoladex RTC in 12 weeks with CBC with diff, CMP, vit D, ESR/CRP, Mag, estradiol, LH, FSH.

## 2025-03-19 NOTE — PHYSICAL EXAM
[Fully active, able to carry on all pre-disease performance without restriction] : Status 0 - Fully active, able to carry on all pre-disease performance without restriction [Normal] : affect appropriate [de-identified] : patient declines breast exam today

## 2025-03-19 NOTE — HISTORY OF PRESENT ILLNESS
[de-identified] : Patient is a 53 year old female here today for initial consultation of breast cancer, initially diagnosed 2019. Left upper outer quadrant lumpectomy conducted at [facility] in [place] and surgical pathology dated 11/15/2019 shows invasive lobular carcinoma of left breast, histologic grade II, tumor 1.1 mm - unifocal, no DCIS, no lymph node involvement. ER >95%, ID > 95%, HER2 negative, Ki67 <5%. MRI conducted on 2019 showed two areas of enhancement - one irregularly marginated and suspicious. Patient seen by Dr. Heath. Left breast US JESSICA seed placement done on 2020. Seen on 2020 for erythema and tenderness at site s/p seed placement, concerns for infection. Patient prescribed cephalexin, stopped taking on 2020. Underwent a secondary lumpectomy and sentinel lymph node biopsy on 2020 - 3 lymph nodes negative.  : 5 Para: 3 Age of onset of menarche: 12 LMP: 2019 oral contraceptive use from [id number], denies HRT  Last bone density scan: denies  father with melanoma otherwise no family history of oncologic or hematologic disorders.  Patient has a smoking history since age 16, on and off, 15 year gap from [id number]. Current smoker 3-5 cigarettes/day. Drinks alcohol moderately 2-3 times/week. [de-identified] : Patient seen and examined today for routine follow up for the management of breast cancer. She remains on Zoladex and exemestane. She notes hotflashes and decreased libido with Exemestane use. She is due for another foot surgery but does not have scheduled yet. Follows with psych and added wellbutrin. Notes brain fog and sometimes blurry vision.  Mammo/Sono: 3/2024 BIRADS 2 due 3/2025 CNY: due 2026  MRI/MRCP 9/2023 Stable 4 mm pancreatic cystic lesion since 05/10/2021.Continued surveillance imaging recommended with follow-up MRI/MRCP in 2 years. DEXA: 1.25 - osteopenia CT Chest Lung Rads 2 stable 3mm nodule small hiatal hernia and esophageal wall thickening  MRI L spine No evidence of osseous metastasis in the lumbar spine. Grade 1 anterolisthesis L4-5 with resultant moderate spinal canal stenosis.

## 2025-03-19 NOTE — PHYSICAL EXAM
Pt amb to lobby s/p d/c   [Fully active, able to carry on all pre-disease performance without restriction] : Status 0 - Fully active, able to carry on all pre-disease performance without restriction [Normal] : affect appropriate [de-identified] : patient declines breast exam today

## 2025-03-19 NOTE — ASSESSMENT
[FreeTextEntry1] : #Breast Cancer - ILC of L breast ER+ (95%)/ IA+ (95%), HER2-, Ki 67 <5% s/p lumpectomy and SLN biopsy (T1b, N0, Mx) - Stage IA. - We reviewed the diagnosis, prognosis, and natural history of ILC, ER+ (95%)/ IA+ (95%), HER2- - We have reviewed her previous pathology and radiology reports. - We have reviewed the NCCN guidelines and patient expresses understanding and would like to proceed with the below plan. - Genetics - VUS PALB2. - She is s/p lumpectomy. - Oncotype 17 - At time she was 50 yo, Discussed that there is ~1.6% benefit of adding chemo in addition to endocrine therapy. Decided not to pursue chemo - s/p RT - Ovarian suppression with zoladex 3.6 mg monthly. - FSH/LH and estradiol reviewed - ok for exemestane - On exemestane 25 mg PO daily (started 6/2020) take this with Ca++ 1200 mg PO q day and Vit D 800 IU daily to protect her bone health. - Weaned off bupropiron supposedly. Follow up Dr. Brandan Swenson of [facility] to discuss medications and support. [phone]. If psychiatric health is compromised due to zoladex and exemestane can try tamoxifen. - 11/6/2020 - mammo - new architectural changes in the L breast cw breast conservation therapy. repeat diagnostic mammo/sono needed in 6 months (5/2021) No mammo or sonographic findings suspicious for malignancy in either breast - Does not want oopherectomy - DEXA - L spine T score 0.3, Hip T score 0.3, Femoral neck T score = -0.4 - 5/3/2021 - mammo reviewed - post treatment changes in L breast no new or recurrent malignancy of left breast. Bilateral diagnostic mammo 11/2021 - 8/9/2021- continues on zoladex and exemestane. vs and labs reviewed - 9/21/2021 - continues on zoladex and exemestane. vs and labs reviewed. remains premenopausal. bilateral diagnostic mammo/sono due for November. Names given for Dr. Mcpherson. - 10/18/2021 - vs and labs reviewed. continues on zoladex and exemestane. due for mammo/us in Nov - 11/16/2021 continue zoladex and exemestane, ammo/US scheduled 11/23/2021 - 12/14/2021 continue zoladex and exemestane, mammo/US reviewed- no suspicious findings, repeat 12 mos - 1/12/2022 - vs and labs reviewed. zoladex today. continue exemestane, 11/2022 - due for mammo/sono again. needs to follow up with Dr. Ramirez/Rojas. PE benign today - 3/16/2022 vs and labs reviewed, zoladex today. conitnue exemestane - 4/25/22 - reviewed hospitalization course with patient. vs and labs reviewed. zoladex today. will resume exemestane - held since 4/4. Will scan CT C/A/P to assess cancer status given new diagnosis of PEs. November 2022 - 5/24/2022 vs and labs reviewed; pending CT C (angio) A/P and NM - 6/21/2022 vs and labs reviewed; Dr. Spangler's note reviewed; NM scheduled for 6/24/2022 - 7/19/2022 vs and CBC reviewed; continue exemestane + monthly zoladex Recent imaging Reviewed: CTA- no discrete intrinsic filling defect is identified within the main right or left, lobar or proximal segmental pulmonary arteries to suggest central pulmonary emboli CT A/P- no evidence of suspicious mass or metastatic disease within the abdomen or pelvis NM- no scintigraphic evidence of bony metastatic disease - 8/17/22 - vs reviewed. labs drawn in office today. cbc wnl including wbc, hgb, plt. Had fall and oozing from staple site. will decrease eliquis to 2.5 mg BID temporarily for 2 weeks. She has been on treatment dose since 4/2022. She is not getting her period however the most recent FSH and LH are not in postmenopausal range. Estradiol <5. Will discuss with GYN. for now will continue with zoledex and exemestane. Tamoxifen is not a good option given history of blood clots. - 9/19/2022 vs and CBC reviewed, continue exemestane; mammo/US ordered; DEXA ordered-due 12/2022 - 10/17/2022 vs and CBC reviewed; WBC 4.10, hgb 13.8, 329; mammo and DEXA scheduled 12/6/2022 - 11/14/2022 vs and CBC reviewed; WBC 5.6, hgb 14, plt 320; continue exemestane + zoladex hormones last 10/17 estradiol 7, FSH 8.5, LH 0.4 - 12/12/22 - vs reviewed. labs drawn in office today. cbc within normal limits, cmp wnl. continue exemestane and zoladex. Reviewed mammo/us - no evidence of malignancy. DEXA 12/22 - reviewed. Osteopenia of hip. continue ca++ and vit D - 1/9/2023 vs and CBC reviewed; WBC 5.9, hgb 13.6, plt 323 continue exemestane + monthly zoladex injections; hormones last est <5, FSH 9.2, LH < 0.3 discussion about potentially trialing a different AI due to patient's symptoms - 2/6/2023 vs and CBC reviewed; WBC 4.2, hgb 13.6, plt 319; proceed with zoladex and continue PO exemestane - 3/6/2023 - vs reviewed. labs drawn in office today. wbc, hgb and plts wnl. continue with zoladex and exemestane, mammo from 12/22 reviewed. need repeat mammo/sono - 12/23 - 5/30/23 - vs reviewed. labs drawn in office today. wbc, hgb and plts wnl. continue with zoladex and exemestane, patient will decide over next few months if she wants to change regimen or pursue oophorectomy, need repeat mammo/sono - 12/23 - 8/28/23 vs and CBC reviewed; WBC 4.80, hgb 13.3, plt 337. Repeat hormones today. Continue exemestane and Zoladex monthly. Deciding about oopherectomy. Due for mammo/sono 12/2023, will order at next visit. - 11/30/23 vs and CBC reviewed; WBC 4.07, hgb 13.9, plt 369. Hormones are in postmenopausal range with slight fluctutations. Continue exemestane and Zoldex. Reviewed that she has been on Zoladex about 3 years with standard 2-5 years. If we trial off would need to monitor hormones. Does not want oopherectomy at this time. proceed with Zoladex today for now.  - 3/6/24 vs and CBC reviewed; WBC 4.57, hgb 13.9, plt 328. Continue Zoladex (overdue) and exemestane. Still thinking about oopherectomy. Zoladex for 2-5 years she has been on for about  3 years, can consider monitoring hormone levels off it, but will need to come monthly to check estradiol, FSH LH and if spike would restart. Mammo/sono 3/4/24 reviewed BIRADS 2 - 5/24 - vs and labs reviewed. labs drawn in office today. cbc wnl. continue zoladex and exemestane (will stop 6/2025). complains of back pain - will check MRI of L spine given discomfort also down legs. Mammo/sono 3/25.  - 9/2024 vs and labs reviewed; continue zoladex and exemestane. Did not go for L spine MRI. Will get scheduled today. Patient asked about estroven for post menoapausal s/s and losing weight. REviewed main ingredient is black cohash and recommend against due to estrogenergic effects. Offered weight management program  - 11/2024 vs and labs reviewed; continue zoladex and exemestane. Mammo due 3/2025notes some brain fog nad intermitent changes to vision - rec optho. Can be due to AI, holding vs monitor vs MRI  3/19/25 - vs and labs reviewed. labs drawn in office today. wbc, hgb and plts wnl. Mammo/sono today.  she wants to consider coming off of zoladex. will assess hormones and make decision but she will need to come for labs to ensure no elevation of estradiol  #Fatigue  - check irons b12 tfts   #back pain - s/p MRI revealing No evidence of osseous metastasis in the lumbar spine.Grade 1 anterolisthesis L4-5 with resultant moderate spinal canal stenosis. can follow with ortho   #Hot Flashes - weaning off Effexor  #Joint pains - Likely 2/2 zoladex. This may worsen with AI. - Given she has been on Zoladex for 3 years and standard is 2-5 years, can trial off and monitor hormones. Will proceed with Zoladex today and at next visits will discuss holding and monitoring hormones   #Neuropathy - Offered gabapentin - does not want to take at this time. can be exemestane in <1% of patients.  #Pancreatic Cysts - Two small up to 4 mm cysts in the pancreas of unlikely significance, due for 2 yr f/u MRI /MRCP to document stability - s/p follow up with GI Dr. Walker - s/p MRI/MRCP 9/2023 revealing stable 4 mm pancreatic cystic lesion since 05/10/2021.Continued surveillance imaging recommended with follow-up MRI/MRCP in 2 years. Reviewed Due 9/2025  #Decreased Libido - Likely 2/2 AI use - Monitor - Follow up with GYN  #Genetics - s/p [employer] 2019 revealing PALB2 c.11C>T (p.Vfc3Qmm) heterozygous VUS - s/p eval with Ly Chung - MRI/MRCP 9/2023 Stable 4 mm pancreatic cystic lesion since 05/10/2021.Continued surveillance imaging recommended with follow-up MRI/MRCP in 2 years. due 9/2025 - Mammo/Sono 3/2024. due now - follow up with GYN - 1/2025 - EGD/[facility] 2021 with Dr. Walker, due 2026 - of note she had LDCT revealing hiatal hernia and esophageal wall thickeneing - rec follow up with GI may need EGD  - Stil thinking about oopherectomy. For now continuing Zoladex and will think about d/c and monitor hormone levels  - DEXA - 1/2025 - Osteopenia femoral neck  #Mental Health - Seeing psychiatry - Remains effexor, adderall, xanax PRN  #Weight gain - 5/26/23 has remained fairly stable over past few visits.  #Chronic Cough - Smoking history - Follows with pulm Dr. Salamanca and Dr. Dallas, last seen 8/2/23 - s/p LDCT 8/2023 reviewed since June 24, 2022; No new or enlarging pulmonary nodules. - due now ordered 9/2024 - LungRads 2 - stable 3mm nodule  - follows with Dr. Salamanca -reviewed note from 3/6/25 -  bout of bilateral patchy pneumonia. I will recommend she repeat her CAT scan of the chest in early April as a follow-up. She may continue to use nebulized treatments on a as needed basis for cough. She is to follow-up with me after the CAT scan is done  #PE - Unprovoked - Elevated cardiolipin and LA - Tolerating Eliquis 5mg BID  - Repeat hypercoag work up 2/2023  - 1/9/2023 hypercoag work up pending - 2/6/2023 LA positive with silica slcotting time 1.4, cardiolipins remain elevated, patient to continue on eliquis - Phone conversation with Dr. Guidry Podiatrist (T [phone] F [phone]) regarding patient's upcoming foot surgery, the patient is cleared to proceed with upcoming procedure. She will require bridging of Lovenox prior to the procedure. Plan for 1 injection Lovenox 80mg twice daily Tuesday 2/7, Wednesday 2/8, and Thursday 2/9; no lovenox injection or eliquis Friday 2/10 the day of procedure. The patient can resume eliquis 5mg PO BID Saturday 2/11/2023 - Continue Eliquis 5mg BID - 11/30/23 patient sought out another opinion with surgeon in [place] she does not recall the name of the surgeon and will get me this information. She is scheduled for L achiles tendon debridement and repair of calcenael Ex ostectomy and gastroc resection as needed, felxor hallicus longus tendon transfer. patient states this is ambulatory. She will require bridging of Lovenox prior to the procedure. Plan for 1 injection Lovenox 80mg twice daily Monday 12/18, Tuesday 12/19, and morning of Weds 12/20 no lovenox injection or eliquis Thursday 12/21 the day of procedure. The patient can resume eliquis 5mg PO BID Friday 12/22/2023 - 5/24 - need foot surgery again after labor day - will transition to lovenox prior to procedure - need another foot surgery, not scheduled advisesd to call when she knows   #Vertebral Artery dissection - Follows with NSx at [facility], has follow up 9/2022 with Dr. Mcgee - Multiple requests made for medical records  #Orthopedic Surgery - Has ongoing pain to the b/l feet and ankle s/p previous tendon surgery. Pending another surgery/second opinion - As above recommendations for Lovenox  - She is s/p recent achilles surgery with Dr. Horton at [facility] - will need another surgery to R foot not scheduled   #Health Maintenance  - Mammo/Sono: 3/2024 BIRADS 2 due 3/2025  - CNY: due 2026  - MRI/MRCP 9/2023 Stable 4 mm pancreatic cystic lesion since 05/10/2021.Continued surveillance imaging recommended with follow-up MRI/MRCP in 2 years. Due 9/2025 - DEXA: 12/2022, due 12/2024 ordered  - CT Chest 2024 LungRads 2   LAB in 4 weeks with possible Zoladex LAB in 8 weeks with possible Zoladex RTC in 12 weeks with CBC with diff, CMP, vit D, ESR/CRP, Mag, estradiol, LH, FSH.

## 2025-03-19 NOTE — HISTORY OF PRESENT ILLNESS
[de-identified] : Patient is a 53 year old female here today for initial consultation of breast cancer, initially diagnosed 2019. Left upper outer quadrant lumpectomy conducted at [facility] in [place] and surgical pathology dated 11/15/2019 shows invasive lobular carcinoma of left breast, histologic grade II, tumor 1.1 mm - unifocal, no DCIS, no lymph node involvement. ER >95%, IN > 95%, HER2 negative, Ki67 <5%. MRI conducted on 2019 showed two areas of enhancement - one irregularly marginated and suspicious. Patient seen by Dr. Heath. Left breast US JESSICA seed placement done on 2020. Seen on 2020 for erythema and tenderness at site s/p seed placement, concerns for infection. Patient prescribed cephalexin, stopped taking on 2020. Underwent a secondary lumpectomy and sentinel lymph node biopsy on 2020 - 3 lymph nodes negative.  : 5 Para: 3 Age of onset of menarche: 12 LMP: 2019 oral contraceptive use from 2872-1906, denies HRT  Last bone density scan: denies  father with melanoma otherwise no family history of oncologic or hematologic disorders.  Patient has a smoking history since age 16, on and off, 15 year gap from [id number]. Current smoker 3-5 cigarettes/day. Drinks alcohol moderately 2-3 times/week. [de-identified] : Patient seen and examined today for routine follow up for the management of breast cancer. She remains on Zoladex and exemestane. She notes hotflashes and decreased libido with Exemestane use. She is due for another foot surgery but does not have scheduled yet. Follows with psych and added wellbutrin. Notes brain fog and sometimes blurry vision.  Mammo/Sono: 3/2024 BIRADS 2 due 3/2025 CNY: due 2026  MRI/MRCP 9/2023 Stable 4 mm pancreatic cystic lesion since 05/10/2021.Continued surveillance imaging recommended with follow-up MRI/MRCP in 2 years. DEXA: 1.25 - osteopenia CT Chest Lung Rads 2 stable 3mm nodule small hiatal hernia and esophageal wall thickening  MRI L spine No evidence of osseous metastasis in the lumbar spine. Grade 1 anterolisthesis L4-5 with resultant moderate spinal canal stenosis.

## 2025-03-19 NOTE — BEGINNING OF VISIT
[0] : 2) Feeling down, depressed, or hopeless: Not at all (0) [[id number]] : 0 [Pain Scale: ___] : On a scale of 1-10, today the patient's pain is a(n) [unfilled]. [Future Reassessment of Pain Scale] : Future reassessment of pain scale [Former] : Former [< 15 Years] : < 15 Years [Date Discussed (MM/DD/YY): ___] : Discussed: [unfilled] [With Patient/Caregiver] : with Patient/Caregiver

## 2025-03-19 NOTE — BEGINNING OF VISIT
[0] : 2) Feeling down, depressed, or hopeless: Not at all (0) [[id number]] : 0 [Pain Scale: ___] : On a scale of 1-10, today the patient's pain is a(n) [unfilled]. [Future Reassessment of Pain Scale] : Future reassessment of pain scale [Former] : Former [< 15 Years] : < 15 Years [Date Discussed (MM/DD/YY): ___] : Discussed: [unfilled] [With Patient/Caregiver] : with Patient/Caregiver

## 2025-03-19 NOTE — HISTORY OF PRESENT ILLNESS
[de-identified] : Patient is a 53 year old female here today for initial consultation of breast cancer, initially diagnosed 2019. Left upper outer quadrant lumpectomy conducted at [facility] in [place] and surgical pathology dated 11/15/2019 shows invasive lobular carcinoma of left breast, histologic grade II, tumor 1.1 mm - unifocal, no DCIS, no lymph node involvement. ER >95%, PA > 95%, HER2 negative, Ki67 <5%. MRI conducted on 2019 showed two areas of enhancement - one irregularly marginated and suspicious. Patient seen by Dr. Heath. Left breast US JESSICA seed placement done on 2020. Seen on 2020 for erythema and tenderness at site s/p seed placement, concerns for infection. Patient prescribed cephalexin, stopped taking on 2020. Underwent a secondary lumpectomy and sentinel lymph node biopsy on 2020 - 3 lymph nodes negative.  : 5 Para: 3 Age of onset of menarche: 12 LMP: 2019 oral contraceptive use from 5419-2495, denies HRT  Last bone density scan: denies  father with melanoma otherwise no family history of oncologic or hematologic disorders.  Patient has a smoking history since age 16, on and off, 15 year gap from [id number]. Current smoker 3-5 cigarettes/day. Drinks alcohol moderately 2-3 times/week. [de-identified] : Patient seen and examined today for routine follow up for the management of breast cancer. She remains on Zoladex and exemestane. She notes hotflashes and decreased libido with Exemestane use. She is due for another foot surgery but does not have scheduled yet. Follows with psych and added wellbutrin. Notes brain fog and sometimes blurry vision.  Mammo/Sono: 3/2024 BIRADS 2 due 3/2025 CNY: due 2026  MRI/MRCP 9/2023 Stable 4 mm pancreatic cystic lesion since 05/10/2021.Continued surveillance imaging recommended with follow-up MRI/MRCP in 2 years. DEXA: 1.25 - osteopenia CT Chest Lung Rads 2 stable 3mm nodule small hiatal hernia and esophageal wall thickening  MRI L spine No evidence of osseous metastasis in the lumbar spine. Grade 1 anterolisthesis L4-5 with resultant moderate spinal canal stenosis.

## 2025-03-19 NOTE — BEGINNING OF VISIT
[0] : 2) Feeling down, depressed, or hopeless: Not at all (0) [[id number]] : 0 [Pain Scale: ___] : On a scale of 1-10, today the patient's pain is a(n) [unfilled]. [Future Reassessment of Pain Scale] : Future reassessment of pain scale [Former] : Former [< 15 Years] : < 15 Years [Date Discussed (MM/DD/YY): ___] : Discussed: [unfilled] [With Patient/Caregiver] : with Patient/Caregiver

## 2025-03-19 NOTE — BEGINNING OF VISIT
[0] : 2) Feeling down, depressed, or hopeless: Not at all (0) [[id number]] : 0 [Pain Scale: ___] : On a scale of 1-10, today the patient's pain is a(n) [unfilled]. [Future Reassessment of Pain Scale] : Future reassessment of pain scale [Former] : Former [< 15 Years] : < 15 Years [Date Discussed (MM/DD/YY): ___] : Discussed: [unfilled] [With Patient/Caregiver] : with Patient/Caregiver

## 2025-03-19 NOTE — ASSESSMENT
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 - We reviewed the diagnosis, prognosis, and natural history of ILC, ER+ (95%)/ NE+ (95%), HER2- - We have reviewed her previous pathology and radiology reports. - We have reviewed the NCCN guidelines and patient expresses understanding and would like to proceed with the below plan. - Genetics - VUS PALB2. - She is s/p lumpectomy. - Oncotype 17 - At time she was 48 yo, Discussed that there is ~1.6% benefit of adding chemo in addition to endocrine therapy. Decided not to pursue chemo - s/p RT - Ovarian suppression with zoladex 3.6 mg monthly. - FSH/LH and estradiol reviewed - ok for exemestane - On exemestane 25 mg PO daily (started 6/2020) take this with Ca++ 1200 mg PO q day and Vit D 800 IU daily to protect her bone health. - Weaned off bupropiron supposedly. Follow up Dr. Brandan Swenson of [facility] to discuss medications and support. [phone]. If psychiatric health is compromised due to zoladex and exemestane can try tamoxifen. - 11/6/2020 - mammo - new architectural changes in the L breast cw breast conservation therapy. repeat diagnostic mammo/sono needed in 6 months (5/2021) No mammo or sonographic findings suspicious for malignancy in either breast - Does not want oopherectomy - DEXA - L spine T score 0.3, Hip T score 0.3, Femoral neck T score = -0.4 - 5/3/2021 - mammo reviewed - post treatment changes in L breast no new or recurrent malignancy of left breast. Bilateral diagnostic mammo 11/2021 - 8/9/2021- continues on zoladex and exemestane. vs and labs reviewed - 9/21/2021 - continues on zoladex and exemestane. vs and labs reviewed. remains premenopausal. bilateral diagnostic mammo/sono due for November. Names given for Dr. Mcpherson. - 10/18/2021 - vs and labs reviewed. continues on zoladex and exemestane. due for mammo/us in Nov - 11/16/2021 continue zoladex and exemestane, ammo/US scheduled 11/23/2021 - 12/14/2021 continue zoladex and exemestane, mammo/US reviewed- no suspicious findings, repeat 12 mos - 1/12/2022 - vs and labs reviewed. zoladex today. continue exemestane, 11/2022 - due for mammo/sono again. needs to follow up with Dr. Ramirez/Rojas. PE benign today - 3/16/2022 vs and labs reviewed, zoladex today. conitnue exemestane - 4/25/22 - reviewed hospitalization course with patient. vs and labs reviewed. zoladex today. will resume exemestane - held since 4/4. Will scan CT C/A/P to assess cancer status given new diagnosis of PEs. November 2022 - 5/24/2022 vs and labs reviewed; pending CT C (angio) A/P and NM - 6/21/2022 vs and labs reviewed; Dr. Spangler's note reviewed; NM scheduled for 6/24/2022 - 7/19/2022 vs and CBC reviewed; continue exemestane + monthly zoladex Recent imaging Reviewed: CTA- no discrete intrinsic filling defect is identified within the main right or left, lobar or proximal segmental pulmonary arteries to suggest central pulmonary emboli CT A/P- no evidence of suspicious mass or metastatic disease within the abdomen or pelvis NM- no scintigraphic evidence of bony metastatic disease - 8/17/22 - vs reviewed. labs drawn in office today. cbc wnl including wbc, hgb, plt. Had fall and oozing from staple site. will decrease eliquis to 2.5 mg BID temporarily for 2 weeks. She has been on treatment dose since 4/2022. She is not getting her period however the most recent FSH and LH are not in postmenopausal range. Estradiol <5. Will discuss with GYN. for now will continue with zoledex and exemestane. Tamoxifen is not a good option given history of blood clots. - 9/19/2022 vs and CBC reviewed, continue exemestane; mammo/US ordered; DEXA ordered-due 12/2022 - 10/17/2022 vs and CBC reviewed; WBC 4.10, hgb 13.8, 329; mammo and DEXA scheduled 12/6/2022 - 11/14/2022 vs and CBC reviewed; WBC 5.6, hgb 14, plt 320; continue exemestane + zoladex hormones last 10/17 estradiol 7, FSH 8.5, LH 0.4 - 12/12/22 - vs reviewed. labs drawn in office today. cbc within normal limits, cmp wnl. continue exemestane and zoladex. Reviewed mammo/us - no evidence of malignancy. DEXA 12/22 - reviewed. Osteopenia of hip. continue ca++ and vit D - 1/9/2023 vs and CBC reviewed; WBC 5.9, hgb 13.6, plt 323 continue exemestane + monthly zoladex injections; hormones last est <5, FSH 9.2, LH < 0.3 discussion about potentially trialing a different AI due to patient's symptoms - 2/6/2023 vs and CBC reviewed; WBC 4.2, hgb 13.6, plt 319; proceed with zoladex and continue PO exemestane - 3/6/2023 - vs reviewed. labs drawn in office today. wbc, hgb and plts wnl. continue with zoladex and exemestane, mammo from 12/22 reviewed. need repeat mammo/sono - 12/23 - 5/30/23 - vs reviewed. labs drawn in office today. wbc, hgb and plts wnl. continue with zoladex and exemestane, patient will decide over next few months if she wants to change regimen or pursue oophorectomy, need repeat mammo/sono - 12/23 - 8/28/23 vs and CBC reviewed; WBC 4.80, hgb 13.3, plt 337. Repeat hormones today. Continue exemestane and Zoladex monthly. Deciding about oopherectomy. Due for mammo/sono 12/2023, will order at next visit. - 11/30/23 vs and CBC reviewed; WBC 4.07, hgb 13.9, plt 369. Hormones are in postmenopausal range with slight fluctutations. Continue exemestane and Zoldex. Reviewed that she has been on Zoladex about 3 years with standard 2-5 years. If we trial off would need to monitor hormones. Does not want oopherectomy at this time. proceed with Zoladex today for now.  - 3/6/24 vs and CBC reviewed; WBC 4.57, hgb 13.9, plt 328. Continue Zoladex (overdue) and exemestane. Still thinking about oopherectomy. Zoladex for 2-5 years she has been on for about  3 years, can consider monitoring hormone levels off it, but will need to come monthly to check estradiol, FSH LH and if spike would restart. Mammo/sono 3/4/24 reviewed BIRADS 2 - 5/24 - vs and labs reviewed. labs drawn in office today. cbc wnl. continue zoladex and exemestane (will stop 6/2025). complains of back pain - will check MRI of L spine given discomfort also down legs. Mammo/sono 3/25.  - 9/2024 vs and labs reviewed; continue zoladex and exemestane. Did not go for L spine MRI. Will get scheduled today. Patient asked about estroven for post menoapausal s/s and losing weight. REviewed main ingredient is black cohash and recommend against due to estrogenergic effects. Offered weight management program  - 11/2024 vs and labs reviewed; continue zoladex and exemestane. Mammo due 3/2025notes some brain fog nad intermitent changes to vision - rec optho. Can be due to AI, holding vs monitor vs MRI  3/19/25 - vs and labs reviewed. labs drawn in office today. wbc, hgb and plts wnl. Mammo/sono today.  she wants to consider coming off of zoladex. will assess hormones and make decision but she will need to come for labs to ensure no elevation of estradiol  #Fatigue  - check irons b12 tfts   #back pain - s/p MRI revealing No evidence of osseous metastasis in the lumbar spine.Grade 1 anterolisthesis L4-5 with resultant moderate spinal canal stenosis. can follow with ortho   #Hot Flashes - weaning off Effexor  #Joint pains - Likely 2/2 zoladex. This may worsen with AI. - Given she has been on Zoladex for 3 years and standard is 2-5 years, can trial off and monitor hormones. Will proceed with Zoladex today and at next visits will discuss holding and monitoring hormones   #Neuropathy - Offered gabapentin - does not want to take at this time. can be exemestane in <1% of patients.  #Pancreatic Cysts - Two small up to 4 mm cysts in the pancreas of unlikely significance, due for 2 yr f/u MRI /MRCP to document stability - s/p follow up with GI Dr. Walker - s/p MRI/MRCP 9/2023 revealing stable 4 mm pancreatic cystic lesion since 05/10/2021.Continued surveillance imaging recommended with follow-up MRI/MRCP in 2 years. Reviewed Due 9/2025  #Decreased Libido - Likely 2/2 AI use - Monitor - Follow up with GYN  #Genetics - s/p [employer] 2019 revealing PALB2 c.11C>T ([id number]) heterozygous VUS - s/p eval with Ly Chung - MRI/MRCP 9/2023 Stable 4 mm pancreatic cystic lesion since 05/10/2021.Continued surveillance imaging recommended with follow-up MRI/MRCP in 2 years. due 9/2025 - Mammo/Sono 3/2024. due now - follow up with GYN - 1/2025 - EGD/[facility] 2021 with Dr. Walker, due 2026 - of note she had LDCT revealing hiatal hernia and esophageal wall thickeneing - rec follow up with GI may need EGD  - Stil thinking about oopherectomy. For now continuing Zoladex and will think about d/c and monitor hormone levels  - DEXA - 1/2025 - Osteopenia femoral neck  #Mental Health - Seeing psychiatry - Remains effexor, adderall, xanax PRN  #Weight gain - 5/26/23 has remained fairly stable over past few visits.  #Chronic Cough - Smoking history - Follows with pulm Dr. Salamanca and Dr. Dallas, last seen 8/2/23 - s/p LDCT 8/2023 reviewed since June 24, 2022; No new or enlarging pulmonary nodules. - due now ordered 9/2024 - LungRads 2 - stable 3mm nodule  - follows with Dr. Salamanca -reviewed note from 3/6/25 -  bout of bilateral patchy pneumonia. I will recommend she repeat her CAT scan of the chest in early April as a follow-up. She may continue to use nebulized treatments on a as needed basis for cough. She is to follow-up with me after the CAT scan is done  #PE - Unprovoked - Elevated cardiolipin and LA - Tolerating Eliquis 5mg BID  - Repeat hypercoag work up 2/2023  - 1/9/2023 hypercoag work up pending - 2/6/2023 LA positive with silica slcotting time 1.4, cardiolipins remain elevated, patient to continue on eliquis - Phone conversation with Dr. Guidry Podiatrist (T [phone] F [phone]) regarding patient's upcoming foot surgery, the patient is cleared to proceed with upcoming procedure. She will require bridging of Lovenox prior to the procedure. Plan for 1 injection Lovenox 80mg twice daily Tuesday 2/7, Wednesday 2/8, and Thursday 2/9; no lovenox injection or eliquis Friday 2/10 the day of procedure. The patient can resume eliquis 5mg PO BID Saturday 2/11/2023 - Continue Eliquis 5mg BID - 11/30/23 patient sought out another opinion with surgeon in [place] she does not recall the name of the surgeon and will get me this information. She is scheduled for L achiles tendon debridement and repair of calcenael Ex ostectomy and gastroc resection as needed, felxor hallicus longus tendon transfer. patient states this is ambulatory. She will require bridging of Lovenox prior to the procedure. Plan for 1 injection Lovenox 80mg twice daily Monday 12/18, Tuesday 12/19, and morning of Weds 12/20 no lovenox injection or eliquis Thursday 12/21 the day of procedure. The patient can resume eliquis 5mg PO BID Friday 12/22/2023 - 5/24 - need foot surgery again after labor day - will transition to lovenox prior to procedure - need another foot surgery, not scheduled advisesd to call when she knows   #Vertebral Artery dissection - Follows with [facility] at [facility], has follow up 9/2022 with Dr. Mcgee - Multiple requests made for medical records  #Orthopedic Surgery - Has ongoing pain to the b/l feet and ankle s/p previous tendon surgery. Pending another surgery/second opinion - As above recommendations for Lovenox  - She is s/p recent achilles surgery with Dr. Horton at [facility] - will need another surgery to R foot not scheduled   #Health Maintenance  - Mammo/Sono: 3/2024 BIRADS 2 due 3/2025  - CNY: due 2026  - MRI/MRCP 9/2023 Stable 4 mm pancreatic cystic lesion since 05/10/2021.Continued surveillance imaging recommended with follow-up MRI/MRCP in 2 years. Due 9/2025 - DEXA: 12/2022, due 12/2024 ordered  - CT Chest 2024 LungRads 2   LAB in 4 weeks with possible Zoladex LAB in 8 weeks with possible Zoladex RTC in 12 weeks with CBC with diff, CMP, vit D, ESR/CRP, Mag, estradiol, LH, FSH.

## 2025-03-19 NOTE — PHYSICAL EXAM
[Fully active, able to carry on all pre-disease performance without restriction] : Status 0 - Fully active, able to carry on all pre-disease performance without restriction [Normal] : affect appropriate [de-identified] : patient declines breast exam today

## 2025-03-19 NOTE — HISTORY OF PRESENT ILLNESS
[de-identified] : Patient is a 53 year old female here today for initial consultation of breast cancer, initially diagnosed 2019. Left upper outer quadrant lumpectomy conducted at [facility] in [place] and surgical pathology dated 11/15/2019 shows invasive lobular carcinoma of left breast, histologic grade II, tumor 1.1 mm - unifocal, no DCIS, no lymph node involvement. ER >95%, AK > 95%, HER2 negative, Ki67 <5%. MRI conducted on 2019 showed two areas of enhancement - one irregularly marginated and suspicious. Patient seen by Dr. Heath. Left breast US JESSICA seed placement done on 2020. Seen on 2020 for erythema and tenderness at site s/p seed placement, concerns for infection. Patient prescribed cephalexin, stopped taking on 2020. Underwent a secondary lumpectomy and sentinel lymph node biopsy on 2020 - 3 lymph nodes negative.  : 5 Para: 3 Age of onset of menarche: 12 LMP: 2019 oral contraceptive use from [id number], denies HRT  Last bone density scan: denies  father with melanoma otherwise no family history of oncologic or hematologic disorders.  Patient has a smoking history since age 16, on and off, 15 year gap from [id number]. Current smoker 3-5 cigarettes/day. Drinks alcohol moderately 2-3 times/week. [de-identified] : Patient seen and examined today for routine follow up for the management of breast cancer. She remains on Zoladex and exemestane. She notes hotflashes and decreased libido with Exemestane use. She is due for another foot surgery but does not have scheduled yet. Follows with psych and added wellbutrin. Notes brain fog and sometimes blurry vision.  Mammo/Sono: 3/2024 BIRADS 2 due 3/2025 CNY: due 2026  MRI/MRCP 9/2023 Stable 4 mm pancreatic cystic lesion since 05/10/2021.Continued surveillance imaging recommended with follow-up MRI/MRCP in 2 years. DEXA: 1.25 - osteopenia CT Chest Lung Rads 2 stable 3mm nodule small hiatal hernia and esophageal wall thickening  MRI L spine No evidence of osseous metastasis in the lumbar spine. Grade 1 anterolisthesis L4-5 with resultant moderate spinal canal stenosis.

## 2025-03-19 NOTE — PHYSICAL EXAM
[Fully active, able to carry on all pre-disease performance without restriction] : Status 0 - Fully active, able to carry on all pre-disease performance without restriction [Normal] : affect appropriate [de-identified] : patient declines breast exam today

## 2025-03-19 NOTE — ASSESSMENT
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 - We reviewed the diagnosis, prognosis, and natural history of ILC, ER+ (95%)/ ME+ (95%), HER2- - We have reviewed her previous pathology and radiology reports. - We have reviewed the NCCN guidelines and patient expresses understanding and would like to proceed with the below plan. - Genetics - VUS PALB2. - She is s/p lumpectomy. - Oncotype 17 - At time she was 50 yo, Discussed that there is ~1.6% benefit of adding chemo in addition to endocrine therapy. Decided not to pursue chemo - s/p RT - Ovarian suppression with zoladex 3.6 mg monthly. - FSH/LH and estradiol reviewed - ok for exemestane - On exemestane 25 mg PO daily (started 6/2020) take this with Ca++ 1200 mg PO q day and Vit D 800 IU daily to protect her bone health. - Weaned off bupropiron supposedly. Follow up Dr. Brandan Swenson of [facility] to discuss medications and support. [phone]. If psychiatric health is compromised due to zoladex and exemestane can try tamoxifen. - 11/6/2020 - mammo - new architectural changes in the L breast cw breast conservation therapy. repeat diagnostic mammo/sono needed in 6 months (5/2021) No mammo or sonographic findings suspicious for malignancy in either breast - Does not want oopherectomy - DEXA - L spine T score 0.3, Hip T score 0.3, Femoral neck T score = -0.4 - 5/3/2021 - mammo reviewed - post treatment changes in L breast no new or recurrent malignancy of left breast. Bilateral diagnostic mammo 11/2021 - 8/9/2021- continues on zoladex and exemestane. vs and labs reviewed - 9/21/2021 - continues on zoladex and exemestane. vs and labs reviewed. remains premenopausal. bilateral diagnostic mammo/sono due for November. Names given for Dr. Mcpherson. - 10/18/2021 - vs and labs reviewed. continues on zoladex and exemestane. due for mammo/us in Nov - 11/16/2021 continue zoladex and exemestane, ammo/US scheduled 11/23/2021 - 12/14/2021 continue zoladex and exemestane, mammo/US reviewed- no suspicious findings, repeat 12 mos - 1/12/2022 - vs and labs reviewed. zoladex today. continue exemestane, 11/2022 - due for mammo/sono again. needs to follow up with Dr. Ramirez/Rojas. PE benign today - 3/16/2022 vs and labs reviewed, zoladex today. conitnue exemestane - 4/25/22 - reviewed hospitalization course with patient. vs and labs reviewed. zoladex today. will resume exemestane - held since 4/4. Will scan CT C/A/P to assess cancer status given new diagnosis of PEs. November 2022 - 5/24/2022 vs and labs reviewed; pending CT C (angio) A/P and NM - 6/21/2022 vs and labs reviewed; Dr. Spangler's note reviewed; NM scheduled for 6/24/2022 - 7/19/2022 vs and CBC reviewed; continue exemestane + monthly zoladex Recent imaging Reviewed: CTA- no discrete intrinsic filling defect is identified within the main right or left, lobar or proximal segmental pulmonary arteries to suggest central pulmonary emboli CT A/P- no evidence of suspicious mass or metastatic disease within the abdomen or pelvis NM- no scintigraphic evidence of bony metastatic disease - 8/17/22 - vs reviewed. labs drawn in office today. cbc wnl including wbc, hgb, plt. Had fall and oozing from staple site. will decrease eliquis to 2.5 mg BID temporarily for 2 weeks. She has been on treatment dose since 4/2022. She is not getting her period however the most recent FSH and LH are not in postmenopausal range. Estradiol <5. Will discuss with GYN. for now will continue with zoledex and exemestane. Tamoxifen is not a good option given history of blood clots. - 9/19/2022 vs and CBC reviewed, continue exemestane; mammo/US ordered; DEXA ordered-due 12/2022 - 10/17/2022 vs and CBC reviewed; WBC 4.10, hgb 13.8, 329; mammo and DEXA scheduled 12/6/2022 - 11/14/2022 vs and CBC reviewed; WBC 5.6, hgb 14, plt 320; continue exemestane + zoladex hormones last 10/17 estradiol 7, FSH 8.5, LH 0.4 - 12/12/22 - vs reviewed. labs drawn in office today. cbc within normal limits, cmp wnl. continue exemestane and zoladex. Reviewed mammo/us - no evidence of malignancy. DEXA 12/22 - reviewed. Osteopenia of hip. continue ca++ and vit D - 1/9/2023 vs and CBC reviewed; WBC 5.9, hgb 13.6, plt 323 continue exemestane + monthly zoladex injections; hormones last est <5, FSH 9.2, LH < 0.3 discussion about potentially trialing a different AI due to patient's symptoms - 2/6/2023 vs and CBC reviewed; WBC 4.2, hgb 13.6, plt 319; proceed with zoladex and continue PO exemestane - 3/6/2023 - vs reviewed. labs drawn in office today. wbc, hgb and plts wnl. continue with zoladex and exemestane, mammo from 12/22 reviewed. need repeat mammo/sono - 12/23 - 5/30/23 - vs reviewed. labs drawn in office today. wbc, hgb and plts wnl. continue with zoladex and exemestane, patient will decide over next few months if she wants to change regimen or pursue oophorectomy, need repeat mammo/sono - 12/23 - 8/28/23 vs and CBC reviewed; WBC 4.80, hgb 13.3, plt 337. Repeat hormones today. Continue exemestane and Zoladex monthly. Deciding about oopherectomy. Due for mammo/sono 12/2023, will order at next visit. - 11/30/23 vs and CBC reviewed; WBC 4.07, hgb 13.9, plt 369. Hormones are in postmenopausal range with slight fluctutations. Continue exemestane and Zoldex. Reviewed that she has been on Zoladex about 3 years with standard 2-5 years. If we trial off would need to monitor hormones. Does not want oopherectomy at this time. proceed with Zoladex today for now.  - 3/6/24 vs and CBC reviewed; WBC 4.57, hgb 13.9, plt 328. Continue Zoladex (overdue) and exemestane. Still thinking about oopherectomy. Zoladex for 2-5 years she has been on for about  3 years, can consider monitoring hormone levels off it, but will need to come monthly to check estradiol, FSH LH and if spike would restart. Mammo/sono 3/4/24 reviewed BIRADS 2 - 5/24 - vs and labs reviewed. labs drawn in office today. cbc wnl. continue zoladex and exemestane (will stop 6/2025). complains of back pain - will check MRI of L spine given discomfort also down legs. Mammo/sono 3/25.  - 9/2024 vs and labs reviewed; continue zoladex and exemestane. Did not go for L spine MRI. Will get scheduled today. Patient asked about estroven for post menoapausal s/s and losing weight. REviewed main ingredient is black cohash and recommend against due to estrogenergic effects. Offered weight management program  - 11/2024 vs and labs reviewed; continue zoladex and exemestane. Mammo due 3/2025notes some brain fog nad intermitent changes to vision - rec optho. Can be due to AI, holding vs monitor vs MRI  3/19/25 - vs and labs reviewed. labs drawn in office today. wbc, hgb and plts wnl. Mammo/sono today.  she wants to consider coming off of zoladex. will assess hormones and make decision but she will need to come for labs to ensure no elevation of estradiol  #Fatigue  - check irons b12 tfts   #back pain - s/p MRI revealing No evidence of osseous metastasis in the lumbar spine.Grade 1 anterolisthesis L4-5 with resultant moderate spinal canal stenosis. can follow with ortho   #Hot Flashes - weaning off Effexor  #Joint pains - Likely 2/2 zoladex. This may worsen with AI. - Given she has been on Zoladex for 3 years and standard is 2-5 years, can trial off and monitor hormones. Will proceed with Zoladex today and at next visits will discuss holding and monitoring hormones   #Neuropathy - Offered gabapentin - does not want to take at this time. can be exemestane in <1% of patients.  #Pancreatic Cysts - Two small up to 4 mm cysts in the pancreas of unlikely significance, due for 2 yr f/u MRI /MRCP to document stability - s/p follow up with GI Dr. Walker - s/p MRI/MRCP 9/2023 revealing stable 4 mm pancreatic cystic lesion since 05/10/2021.Continued surveillance imaging recommended with follow-up MRI/MRCP in 2 years. Reviewed Due 9/2025  #Decreased Libido - Likely 2/2 AI use - Monitor - Follow up with GYN  #Genetics - s/p [employer] 2019 revealing PALB2 c.11C>T (p.[id number]) heterozygous VUS - s/p eval with yL Chung - MRI/MRCP 9/2023 Stable 4 mm pancreatic cystic lesion since 05/10/2021.Continued surveillance imaging recommended with follow-up MRI/MRCP in 2 years. due 9/2025 - Mammo/Sono 3/2024. due now - follow up with GYN - 1/2025 - EGD/[facility] 2021 with Dr. Walker, due 2026 - of note she had LDCT revealing hiatal hernia and esophageal wall thickeneing - rec follow up with GI may need EGD  - Stil thinking about oopherectomy. For now continuing Zoladex and will think about d/c and monitor hormone levels  - DEXA - 1/2025 - Osteopenia femoral neck  #Mental Health - Seeing psychiatry - Remains effexor, adderall, xanax PRN  #Weight gain - 5/26/23 has remained fairly stable over past few visits.  #Chronic Cough - Smoking history - Follows with pulm Dr. Salamanca and Dr. Dallas, last seen 8/2/23 - s/p LDCT 8/2023 reviewed since June 24, 2022; No new or enlarging pulmonary nodules. - due now ordered 9/2024 - LungRads 2 - stable 3mm nodule  - follows with Dr. Salamanca -reviewed note from 3/6/25 -  bout of bilateral patchy pneumonia. I will recommend she repeat her CAT scan of the chest in early April as a follow-up. She may continue to use nebulized treatments on a as needed basis for cough. She is to follow-up with me after the CAT scan is done  #PE - Unprovoked - Elevated cardiolipin and LA - Tolerating Eliquis 5mg BID  - Repeat hypercoag work up 2/2023  - 1/9/2023 hypercoag work up pending - 2/6/2023 LA positive with silica slcotting time 1.4, cardiolipins remain elevated, patient to continue on eliquis - Phone conversation with Dr. Guidry Podiatrist (T [phone] F [phone]) regarding patient's upcoming foot surgery, the patient is cleared to proceed with upcoming procedure. She will require bridging of Lovenox prior to the procedure. Plan for 1 injection Lovenox 80mg twice daily Tuesday 2/7, Wednesday 2/8, and Thursday 2/9; no lovenox injection or eliquis Friday 2/10 the day of procedure. The patient can resume eliquis 5mg PO BID Saturday 2/11/2023 - Continue Eliquis 5mg BID - 11/30/23 patient sought out another opinion with surgeon in [place] she does not recall the name of the surgeon and will get me this information. She is scheduled for L achiles tendon debridement and repair of calcenael Ex ostectomy and gastroc resection as needed, felxor hallicus longus tendon transfer. patient states this is ambulatory. She will require bridging of Lovenox prior to the procedure. Plan for 1 injection Lovenox 80mg twice daily Monday 12/18, Tuesday 12/19, and morning of Weds 12/20 no lovenox injection or eliquis Thursday 12/21 the day of procedure. The patient can resume eliquis 5mg PO BID Friday 12/22/2023 - 5/24 - need foot surgery again after labor day - will transition to lovenox prior to procedure - need another foot surgery, not scheduled advisesd to call when she knows   #Vertebral Artery dissection - Follows with [facility] at [facility], has follow up 9/2022 with Dr. Mcgee - Multiple requests made for medical records  #Orthopedic Surgery - Has ongoing pain to the b/l feet and ankle s/p previous tendon surgery. Pending another surgery/second opinion - As above recommendations for Lovenox  - She is s/p recent achilles surgery with Dr. Horton at [facility] - will need another surgery to R foot not scheduled   #Health Maintenance  - Mammo/Sono: 3/2024 BIRADS 2 due 3/2025  - CNY: due 2026  - MRI/MRCP 9/2023 Stable 4 mm pancreatic cystic lesion since 05/10/2021.Continued surveillance imaging recommended with follow-up MRI/MRCP in 2 years. Due 9/2025 - DEXA: 12/2022, due 12/2024 ordered  - CT Chest 2024 LungRads 2   LAB in 4 weeks with possible Zoladex LAB in 8 weeks with possible Zoladex RTC in 12 weeks with CBC with diff, CMP, vit D, ESR/CRP, Mag, estradiol, LH, FSH.

## 2025-03-31 NOTE — HISTORY OF PRESENT ILLNESS
[TextBox_4] : 54-year-old female former 1 pack/day smoker who quit in 2020.  She has a history of a chronic cough dry for many years.  Workup including negative PFTs and CAT scan of the chest.  She was hospitalized in February with bilateral patchy infiltrates felt to be pneumonia.  She was treated with antibiotics and clinically improved.  She now is feeling better with only her baseline slight cough.  There is no real shortness of breath no wheezing fever or chills.  She is pretty much back to her baseline.  She still uses nebulizer treatments about every other day for unclear reasons.  Past history of breast cancer in 2019 and a history of a brain aneurysm. cigarettes

## 2025-06-11 NOTE — PHYSICAL EXAM
[Fully active, able to carry on all pre-disease performance without restriction] : Status 0 - Fully active, able to carry on all pre-disease performance without restriction [Normal] : affect appropriate [de-identified] : R breast with skin thickening , radiation changes and scar tissue to the outer quadrant no masses or LAD bilateally

## 2025-06-11 NOTE — PHYSICAL EXAM
[Fully active, able to carry on all pre-disease performance without restriction] : Status 0 - Fully active, able to carry on all pre-disease performance without restriction [Normal] : affect appropriate [de-identified] : R breast with skin thickening , radiation changes and scar tissue to the outer quadrant no masses or LAD bilateally

## 2025-06-11 NOTE — HISTORY OF PRESENT ILLNESS
[de-identified] : Patient is a 53 year old female here today for initial consultation of breast cancer, initially diagnosed 2019. Left upper outer quadrant lumpectomy conducted at [facility] in [place] and surgical pathology dated 11/15/2019 shows invasive lobular carcinoma of left breast, histologic grade II, tumor 1.1 mm - unifocal, no DCIS, no lymph node involvement. ER >95%, OR > 95%, HER2 negative, Ki67 <5%. MRI conducted on 2019 showed two areas of enhancement - one irregularly marginated and suspicious. Patient seen by Dr. Heath. Left breast US JESSICA seed placement done on 2020. Seen on 2020 for erythema and tenderness at site s/p seed placement, concerns for infection. Patient prescribed cephalexin, stopped taking on 2020. Underwent a secondary lumpectomy and sentinel lymph node biopsy on 2020 - 3 lymph nodes negative.  : 5 Para: 3 Age of onset of menarche: 12 LMP: 2019 oral contraceptive use from [id number], denies HRT  Last bone density scan: denies  father with melanoma otherwise no family history of oncologic or hematologic disorders.  Patient has a smoking history since age 16, on and off, 15 year gap from [id number]. Current smoker 3-5 cigarettes/day. Drinks alcohol moderately 2-3 times/week. [de-identified] : Patient seen and examined today for routine follow up for the management of breast cancer. She remains on Zoladex and exemestane however was noted at last visit she could try coming off but she has come for injection. She notes hotflashes and decreased libido with Exemestane use. Now finished 5 years AI and zoladex  Mammo/Sono: 3/2025 CNY: due 2026  MRI/MRCP 9/2023 Stable 4 mm pancreatic cystic lesion since 05/10/2021.Continued surveillance imaging recommended with follow-up MRI/MRCP in 2 years. DEXA: 1/25 - osteopenia CT Chest 4/2025 stable  MRI L spine No evidence of osseous metastasis in the lumbar spine. Grade 1 anterolisthesis L4-5 with resultant moderate spinal canal stenosis.

## 2025-06-11 NOTE — ASSESSMENT
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 - We reviewed the diagnosis, prognosis, and natural history of ILC, ER+ (95%)/ MT+ (95%), HER2- - We have reviewed her previous pathology and radiology reports. - We have reviewed the NCCN guidelines and patient expresses understanding and would like to proceed with the below plan. - Genetics - VUS PALB2. - She is s/p lumpectomy. - Oncotype 17 - At time she was 48 yo, Discussed that there is ~1.6% benefit of adding chemo in addition to endocrine therapy. Decided not to pursue chemo - s/p RT - Ovarian suppression with zoladex 3.6 mg monthly. - FSH/LH and estradiol reviewed - ok for exemestane - On exemestane 25 mg PO daily (started 6/2020) take this with Ca++ 1200 mg PO q day and Vit D 800 IU daily to protect her bone health. - Weaned off bupropiron supposedly. Follow up Dr. Brandan Swenson of [facility] to discuss medications and support. [phone]. If psychiatric health is compromised due to zoladex and exemestane can try tamoxifen. - 11/6/2020 - mammo - new architectural changes in the L breast cw breast conservation therapy. repeat diagnostic mammo/sono needed in 6 months (5/2021) No mammo or sonographic findings suspicious for malignancy in either breast - Does not want oopherectomy - DEXA - L spine T score 0.3, Hip T score 0.3, Femoral neck T score = -0.4 - 5/3/2021 - mammo reviewed - post treatment changes in L breast no new or recurrent malignancy of left breast. Bilateral diagnostic mammo 11/2021 - 8/9/2021- continues on zoladex and exemestane. vs and labs reviewed - 9/21/2021 - continues on zoladex and exemestane. vs and labs reviewed. remains premenopausal. bilateral diagnostic mammo/sono due for November. Names given for Dr. Mcpherson. - 10/18/2021 - vs and labs reviewed. continues on zoladex and exemestane. due for mammo/us in Nov - 11/16/2021 continue zoladex and exemestane, ammo/US scheduled 11/23/2021 - 12/14/2021 continue zoladex and exemestane, mammo/US reviewed- no suspicious findings, repeat 12 mos - 1/12/2022 - vs and labs reviewed. zoladex today. continue exemestane, 11/2022 - due for mammo/sono again. needs to follow up with Dr. Ramirez/Rojas. PE benign today - 3/16/2022 vs and labs reviewed, zoladex today. conitnue exemestane - 4/25/22 - reviewed hospitalization course with patient. vs and labs reviewed. zoladex today. will resume exemestane - held since 4/4. Will scan CT C/A/P to assess cancer status given new diagnosis of PEs. November 2022 - 5/24/2022 vs and labs reviewed; pending CT C (angio) A/P and NM - 6/21/2022 vs and labs reviewed; Dr. Spangler's note reviewed; NM scheduled for 6/24/2022 - 7/19/2022 vs and CBC reviewed; continue exemestane + monthly zoladex Recent imaging Reviewed: CTA- no discrete intrinsic filling defect is identified within the main right or left, lobar or proximal segmental pulmonary arteries to suggest central pulmonary emboli CT A/P- no evidence of suspicious mass or metastatic disease within the abdomen or pelvis NM- no scintigraphic evidence of bony metastatic disease - 8/17/22 - vs reviewed. labs drawn in office today. cbc wnl including wbc, hgb, plt. Had fall and oozing from staple site. will decrease eliquis to 2.5 mg BID temporarily for 2 weeks. She has been on treatment dose since 4/2022. She is not getting her period however the most recent FSH and LH are not in postmenopausal range. Estradiol <5. Will discuss with GYN. for now will continue with zoledex and exemestane. Tamoxifen is not a good option given history of blood clots. - 9/19/2022 vs and CBC reviewed, continue exemestane; mammo/US ordered; DEXA ordered-due 12/2022 - 10/17/2022 vs and CBC reviewed; WBC 4.10, hgb 13.8, 329; mammo and DEXA scheduled 12/6/2022 - 11/14/2022 vs and CBC reviewed; WBC 5.6, hgb 14, plt 320; continue exemestane + zoladex hormones last 10/17 estradiol 7, FSH 8.5, LH 0.4 - 12/12/22 - vs reviewed. labs drawn in office today. cbc within normal limits, cmp wnl. continue exemestane and zoladex. Reviewed mammo/us - no evidence of malignancy. DEXA 12/22 - reviewed. Osteopenia of hip. continue ca++ and vit D - 1/9/2023 vs and CBC reviewed; WBC 5.9, hgb 13.6, plt 323 continue exemestane + monthly zoladex injections; hormones last est <5, FSH 9.2, LH < 0.3 discussion about potentially trialing a different AI due to patient's symptoms - 2/6/2023 vs and CBC reviewed; WBC 4.2, hgb 13.6, plt 319; proceed with zoladex and continue PO exemestane - 3/6/2023 - vs reviewed. labs drawn in office today. wbc, hgb and plts wnl. continue with zoladex and exemestane, mammo from 12/22 reviewed. need repeat mammo/sono - 12/23 - 5/30/23 - vs reviewed. labs drawn in office today. wbc, hgb and plts wnl. continue with zoladex and exemestane, patient will decide over next few months if she wants to change regimen or pursue oophorectomy, need repeat mammo/sono - 12/23 - 8/28/23 vs and CBC reviewed; WBC 4.80, hgb 13.3, plt 337. Repeat hormones today. Continue exemestane and Zoladex monthly. Deciding about oopherectomy. Due for mammo/sono 12/2023, will order at next visit. - 11/30/23 vs and CBC reviewed; WBC 4.07, hgb 13.9, plt 369. Hormones are in postmenopausal range with slight fluctutations. Continue exemestane and Zoldex. Reviewed that she has been on Zoladex about 3 years with standard 2-5 years. If we trial off would need to monitor hormones. Does not want oopherectomy at this time. proceed with Zoladex today for now.  - 3/6/24 vs and CBC reviewed; WBC 4.57, hgb 13.9, plt 328. Continue Zoladex (overdue) and exemestane. Still thinking about oopherectomy. Zoladex for 2-5 years she has been on for about  3 years, can consider monitoring hormone levels off it, but will need to come monthly to check estradiol, FSH LH and if spike would restart. Mammo/sono 3/4/24 reviewed BIRADS 2 - 5/24 - vs and labs reviewed. labs drawn in office today. cbc wnl. continue zoladex and exemestane (will stop 6/2025). complains of back pain - will check MRI of L spine given discomfort also down legs. Mammo/sono 3/25.  - 9/2024 vs and labs reviewed; continue zoladex and exemestane. Did not go for L spine MRI. Will get scheduled today. Patient asked about estroven for post menoapausal s/s and losing weight. REviewed main ingredient is black cohash and recommend against due to estrogenergic effects. Offered weight management program  - 11/2024 vs and labs reviewed; continue zoladex and exemestane. Mammo due 3/2025notes some brain fog nad intermitent changes to vision - rec optho. Can be due to AI, holding vs monitor vs MRI  3/19/25 - vs and labs reviewed. labs drawn in office today. wbc, hgb and plts wnl. Mammo/sono today.  she wants to consider coming off of zoladex. will assess hormones and make decision but she will need to come for labs to ensure no elevation of estradiol 6/11/25 did not come off zoladex as above hormones remain postmenopausal - she has  now completed 5 years of AI and zoladex as above can stop. Will have her check hormones if she gets a cycle or hormones increase may need to restart. She understands. s/p mammo/sono 3/2025 BIRADS 2   #Fatigue  - check irons b12 tfts   #back pain - s/p MRI revealing No evidence of osseous metastasis in the lumbar spine.Grade 1 anterolisthesis L4-5 with resultant moderate spinal canal stenosis. can follow with ortho   #Hot Flashes - weaning off Effexor  #Joint pains - Likely 2/2 zoladex and AI as above comlpeted 5 years trialing off   #Neuropathy - Offered gabapentin - does not want to take at this time. can be exemestane in <1% of patients. as above trialing off   #Pancreatic Cysts - Two small up to 4 mm cysts in the pancreas of unlikely significance, due for 2 yr f/u MRI /MRCP to document stability - s/p follow up with GI Dr. Walker - s/p MRI/MRCP 9/2023 revealing stable 4 mm pancreatic cystic lesion since 05/10/2021.Continued surveillance imaging recommended with follow-up MRI/MRCP in 2 years. 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 due 9/2025 - Mammo/Sono 3/2025 - follow up with GYN - 1/2025 - EGD/CNY 2021 with Dr. Walker, due 2026 - of note she had LDCT revealing hiatal hernia and esophageal wall thickeneing - rec follow up with GI may need EGD  - Stil thinking about oopherectomy. For now continuing Zoladex and d/c and will monitor hormone levels  - DEXA - 1/2025 - Osteopenia femoral neck   #Mental Health - Seeing psychiatry - Remains effexor, adderall, xanax PRN - on wellbutrin now as well   #Chronic Cough - Smoking history - Follows with pulm Dr. Salamanca and Dr. Dallas last seen 2025  - s/p repeat CT 4/2025 reviewed   #PE - Unprovoked - Elevated cardiolipin and LA - Tolerating Eliquis 5mg BID  - Repeat hypercoag work up 2/2023  - 1/9/2023 hypercoag work up pending - 2/6/2023 LA positive with silica slcotting time 1.4, cardiolipins remain elevated, patient to continue on eliquis - Phone conversation with Dr. Guidry Podiatrist (T [phone] F [phone]) regarding patient's upcoming foot surgery, the patient is cleared to proceed with upcoming procedure. She will require bridging of Lovenox prior to the procedure. Plan for 1 injection Lovenox 80mg twice daily Tuesday 2/7, Wednesday 2/8, and Thursday 2/9; no lovenox injection or eliquis Friday 2/10 the day of procedure. The patient can resume eliquis 5mg PO BID Saturday 2/11/2023 - Continue Eliquis 5mg BID - 11/30/23 patient sought out another opinion with surgeon in [place] she does not recall the name of the surgeon and will get me this information. She is scheduled for L achiles tendon debridement and repair of calcenael Ex ostectomy and gastroc resection as needed, felxor hallicus longus tendon transfer. patient states this is ambulatory. She will require bridging of Lovenox prior to the procedure. Plan for 1 injection Lovenox 80mg twice daily Monday 12/18, Tuesday 12/19, and morning of Weds 12/20 no lovenox injection or eliquis Thursday 12/21 the day of procedure. The patient can resume eliquis 5mg PO BID Friday 12/22/2023 - 5/24 - need foot surgery again after labor day - will transition to lovenox prior to procedure - need another foot surgery, not scheduled advisesd to call when she knows   #Vertebral Artery dissection - Follows with [facility] at [facility] Dr. Mcgee - Multiple requests made for medical records  #Health Maintenance  - Mammo/Sono: 3/2025 - CNY: due 2026  - MRI/MRCP 9/2023 Stable 4 mm pancreatic cystic lesion since 05/10/2021.Continued surveillance imaging recommended with follow-up MRI/MRCP in 2 years. Due 9/2025 - DEXA: 1/2025 - CT Chest 4/2025  LAB in 4 weeks with possible Zoladex LAB in 8 weeks with possible Zoladex RTC in 12 weeks with CBC with diff, CMP, vit D, ESR/CRP, Mag, estradiol, LH, FSH.possible zoladex   case and management discussed with dr jaime

## 2025-06-11 NOTE — HISTORY OF PRESENT ILLNESS
[de-identified] : Patient is a 53 year old female here today for initial consultation of breast cancer, initially diagnosed 2019. Left upper outer quadrant lumpectomy conducted at [facility] in [place] and surgical pathology dated 11/15/2019 shows invasive lobular carcinoma of left breast, histologic grade II, tumor 1.1 mm - unifocal, no DCIS, no lymph node involvement. ER >95%, IN > 95%, HER2 negative, Ki67 <5%. MRI conducted on 2019 showed two areas of enhancement - one irregularly marginated and suspicious. Patient seen by Dr. Heath. Left breast US JESSICA seed placement done on 2020. Seen on 2020 for erythema and tenderness at site s/p seed placement, concerns for infection. Patient prescribed cephalexin, stopped taking on 2020. Underwent a secondary lumpectomy and sentinel lymph node biopsy on 2020 - 3 lymph nodes negative.  : 5 Para: 3 Age of onset of menarche: 12 LMP: 2019 oral contraceptive use from [id number], denies HRT  Last bone density scan: denies  father with melanoma otherwise no family history of oncologic or hematologic disorders.  Patient has a smoking history since age 16, on and off, 15 year gap from [id number]. Current smoker 3-5 cigarettes/day. Drinks alcohol moderately 2-3 times/week. [de-identified] : Patient seen and examined today for routine follow up for the management of breast cancer. She remains on Zoladex and exemestane however was noted at last visit she could try coming off but she has come for injection. She notes hotflashes and decreased libido with Exemestane use. Now finished 5 years AI and zoladex  Mammo/Sono: 3/2025 CNY: due 2026  MRI/MRCP 9/2023 Stable 4 mm pancreatic cystic lesion since 05/10/2021.Continued surveillance imaging recommended with follow-up MRI/MRCP in 2 years. DEXA: 1/25 - osteopenia CT Chest 4/2025 stable  MRI L spine No evidence of osseous metastasis in the lumbar spine. Grade 1 anterolisthesis L4-5 with resultant moderate spinal canal stenosis.

## 2025-06-11 NOTE — REVIEW OF SYSTEMS
[Night Sweats] : night sweats [Recent Change In Weight] : ~T recent weight change [Red Eyes] : red eyes [Wheezing] : wheezing [Cough] : cough [SOB on Exertion] : shortness of breath during exertion [Joint Pain] : joint pain [Joint Stiffness] : joint stiffness [Hot Flashes] : hot flashes [Negative] : Allergic/Immunologic [FreeTextEntry2] : brain fog and intermittent changes to vision  [Fever] : no fever [Chills] : no chills [Fatigue] : no fatigue [Anxiety] : no anxiety [FreeTextEntry6] : SOB on exertion worse due to allergies [FreeTextEntry9] : back pain, foot pain

## 2025-06-11 NOTE — ASSESSMENT
[FreeTextEntry1] : #Breast Cancer - ILC of L breast ER+ (95%)/ MN+ (95%), HER2-, Ki 67 <5% s/p lumpectomy and SLN biopsy (T1b, N0, Mx) - Stage IA. - We reviewed the diagnosis, prognosis, and natural history of ILC, ER+ (95%)/ MN+ (95%), HER2- - We have reviewed her previous pathology and radiology reports. - We have reviewed the NCCN guidelines and patient expresses understanding and would like to proceed with the below plan. - Genetics - VUS PALB2. - She is s/p lumpectomy. - Oncotype 17 - At time she was 48 yo, Discussed that there is ~1.6% benefit of adding chemo in addition to endocrine therapy. Decided not to pursue chemo - s/p RT - Ovarian suppression with zoladex 3.6 mg monthly. - FSH/LH and estradiol reviewed - ok for exemestane - On exemestane 25 mg PO daily (started 6/2020) take this with Ca++ 1200 mg PO q day and Vit D 800 IU daily to protect her bone health. - Weaned off bupropiron supposedly. Follow up Dr. Brandan Swenson of [facility] to discuss medications and support. [phone]. If psychiatric health is compromised due to zoladex and exemestane can try tamoxifen. - 11/6/2020 - mammo - new architectural changes in the L breast cw breast conservation therapy. repeat diagnostic mammo/sono needed in 6 months (5/2021) No mammo or sonographic findings suspicious for malignancy in either breast - Does not want oopherectomy - DEXA - L spine T score 0.3, Hip T score 0.3, Femoral neck T score = -0.4 - 5/3/2021 - mammo reviewed - post treatment changes in L breast no new or recurrent malignancy of left breast. Bilateral diagnostic mammo 11/2021 - 8/9/2021- continues on zoladex and exemestane. vs and labs reviewed - 9/21/2021 - continues on zoladex and exemestane. vs and labs reviewed. remains premenopausal. bilateral diagnostic mammo/sono due for November. Names given for Dr. Mcpherson. - 10/18/2021 - vs and labs reviewed. continues on zoladex and exemestane. due for mammo/us in Nov - 11/16/2021 continue zoladex and exemestane, ammo/US scheduled 11/23/2021 - 12/14/2021 continue zoladex and exemestane, mammo/US reviewed- no suspicious findings, repeat 12 mos - 1/12/2022 - vs and labs reviewed. zoladex today. continue exemestane, 11/2022 - due for mammo/sono again. needs to follow up with Dr. Ramirez/Rojas. PE benign today - 3/16/2022 vs and labs reviewed, zoladex today. conitnue exemestane - 4/25/22 - reviewed hospitalization course with patient. vs and labs reviewed. zoladex today. will resume exemestane - held since 4/4. Will scan CT C/A/P to assess cancer status given new diagnosis of PEs. November 2022 - 5/24/2022 vs and labs reviewed; pending CT C (angio) A/P and NM - 6/21/2022 vs and labs reviewed; Dr. Spangler's note reviewed; NM scheduled for 6/24/2022 - 7/19/2022 vs and CBC reviewed; continue exemestane + monthly zoladex Recent imaging Reviewed: CTA- no discrete intrinsic filling defect is identified within the main right or left, lobar or proximal segmental pulmonary arteries to suggest central pulmonary emboli CT A/P- no evidence of suspicious mass or metastatic disease within the abdomen or pelvis NM- no scintigraphic evidence of bony metastatic disease - 8/17/22 - vs reviewed. labs drawn in office today. cbc wnl including wbc, hgb, plt. Had fall and oozing from staple site. will decrease eliquis to 2.5 mg BID temporarily for 2 weeks. She has been on treatment dose since 4/2022. She is not getting her period however the most recent FSH and LH are not in postmenopausal range. Estradiol <5. Will discuss with GYN. for now will continue with zoledex and exemestane. Tamoxifen is not a good option given history of blood clots. - 9/19/2022 vs and CBC reviewed, continue exemestane; mammo/US ordered; DEXA ordered-due 12/2022 - 10/17/2022 vs and CBC reviewed; WBC 4.10, hgb 13.8, 329; mammo and DEXA scheduled 12/6/2022 - 11/14/2022 vs and CBC reviewed; WBC 5.6, hgb 14, plt 320; continue exemestane + zoladex hormones last 10/17 estradiol 7, FSH 8.5, LH 0.4 - 12/12/22 - vs reviewed. labs drawn in office today. cbc within normal limits, cmp wnl. continue exemestane and zoladex. Reviewed mammo/us - no evidence of malignancy. DEXA 12/22 - reviewed. Osteopenia of hip. continue ca++ and vit D - 1/9/2023 vs and CBC reviewed; WBC 5.9, hgb 13.6, plt 323 continue exemestane + monthly zoladex injections; hormones last est <5, FSH 9.2, LH < 0.3 discussion about potentially trialing a different AI due to patient's symptoms - 2/6/2023 vs and CBC reviewed; WBC 4.2, hgb 13.6, plt 319; proceed with zoladex and continue PO exemestane - 3/6/2023 - vs reviewed. labs drawn in office today. wbc, hgb and plts wnl. continue with zoladex and exemestane, mammo from 12/22 reviewed. need repeat mammo/sono - 12/23 - 5/30/23 - vs reviewed. labs drawn in office today. wbc, hgb and plts wnl. continue with zoladex and exemestane, patient will decide over next few months if she wants to change regimen or pursue oophorectomy, need repeat mammo/sono - 12/23 - 8/28/23 vs and CBC reviewed; WBC 4.80, hgb 13.3, plt 337. Repeat hormones today. Continue exemestane and Zoladex monthly. Deciding about oopherectomy. Due for mammo/sono 12/2023, will order at next visit. - 11/30/23 vs and CBC reviewed; WBC 4.07, hgb 13.9, plt 369. Hormones are in postmenopausal range with slight fluctutations. Continue exemestane and Zoldex. Reviewed that she has been on Zoladex about 3 years with standard 2-5 years. If we trial off would need to monitor hormones. Does not want oopherectomy at this time. proceed with Zoladex today for now.  - 3/6/24 vs and CBC reviewed; WBC 4.57, hgb 13.9, plt 328. Continue Zoladex (overdue) and exemestane. Still thinking about oopherectomy. Zoladex for 2-5 years she has been on for about  3 years, can consider monitoring hormone levels off it, but will need to come monthly to check estradiol, FSH LH and if spike would restart. Mammo/sono 3/4/24 reviewed BIRADS 2 - 5/24 - vs and labs reviewed. labs drawn in office today. cbc wnl. continue zoladex and exemestane (will stop 6/2025). complains of back pain - will check MRI of L spine given discomfort also down legs. Mammo/sono 3/25.  - 9/2024 vs and labs reviewed; continue zoladex and exemestane. Did not go for L spine MRI. Will get scheduled today. Patient asked about estroven for post menoapausal s/s and losing weight. REviewed main ingredient is black cohash and recommend against due to estrogenergic effects. Offered weight management program  - 11/2024 vs and labs reviewed; continue zoladex and exemestane. Mammo due 3/2025notes some brain fog nad intermitent changes to vision - rec optho. Can be due to AI, holding vs monitor vs MRI  3/19/25 - vs and labs reviewed. labs drawn in office today. wbc, hgb and plts wnl. Mammo/sono today.  she wants to consider coming off of zoladex. will assess hormones and make decision but she will need to come for labs to ensure no elevation of estradiol 6/11/25 did not come off zoladex as above hormones remain postmenopausal - she has  now completed 5 years of AI and zoladex as above can stop. Will have her check hormones if she gets a cycle or hormones increase may need to restart. She understands. s/p mammo/sono 3/2025 BIRADS 2   #Fatigue  - check irons b12 tfts   #back pain - s/p MRI revealing No evidence of osseous metastasis in the lumbar spine.Grade 1 anterolisthesis L4-5 with resultant moderate spinal canal stenosis. can follow with ortho   #Hot Flashes - weaning off Effexor  #Joint pains - Likely 2/2 zoladex and AI as above comlpeted 5 years trialing off   #Neuropathy - Offered gabapentin - does not want to take at this time. can be exemestane in <1% of patients. as above trialing off   #Pancreatic Cysts - Two small up to 4 mm cysts in the pancreas of unlikely significance, due for 2 yr f/u MRI /MRCP to document stability - s/p follow up with GI Dr. Walker - s/p MRI/MRCP 9/2023 revealing stable 4 mm pancreatic cystic lesion since 05/10/2021.Continued surveillance imaging recommended with follow-up MRI/MRCP in 2 years. 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 due 9/2025 - Mammo/Sono 3/2025 - follow up with GYN - 1/2025 - EGD/CNY 2021 with Dr. Walker, due 2026 - of note she had LDCT revealing hiatal hernia and esophageal wall thickeneing - rec follow up with GI may need EGD  - Stil thinking about oopherectomy. For now continuing Zoladex and d/c and will monitor hormone levels  - DEXA - 1/2025 - Osteopenia femoral neck   #Mental Health - Seeing psychiatry - Remains effexor, adderall, xanax PRN - on wellbutrin now as well   #Chronic Cough - Smoking history - Follows with pulm Dr. Salamanca and Dr. Dallas last seen 2025  - s/p repeat CT 4/2025 reviewed   #PE - Unprovoked - Elevated cardiolipin and LA - Tolerating Eliquis 5mg BID  - Repeat hypercoag work up 2/2023  - 1/9/2023 hypercoag work up pending - 2/6/2023 LA positive with silica slcotting time 1.4, cardiolipins remain elevated, patient to continue on eliquis - Phone conversation with Dr. Guidry Podiatrist (T [phone] F [phone]) regarding patient's upcoming foot surgery, the patient is cleared to proceed with upcoming procedure. She will require bridging of Lovenox prior to the procedure. Plan for 1 injection Lovenox 80mg twice daily Tuesday 2/7, Wednesday 2/8, and Thursday 2/9; no lovenox injection or eliquis Friday 2/10 the day of procedure. The patient can resume eliquis 5mg PO BID Saturday 2/11/2023 - Continue Eliquis 5mg BID - 11/30/23 patient sought out another opinion with surgeon in [place] she does not recall the name of the surgeon and will get me this information. She is scheduled for L achiles tendon debridement and repair of calcenael Ex ostectomy and gastroc resection as needed, felxor hallicus longus tendon transfer. patient states this is ambulatory. She will require bridging of Lovenox prior to the procedure. Plan for 1 injection Lovenox 80mg twice daily Monday 12/18, Tuesday 12/19, and morning of Weds 12/20 no lovenox injection or eliquis Thursday 12/21 the day of procedure. The patient can resume eliquis 5mg PO BID Friday 12/22/2023 - 5/24 - need foot surgery again after labor day - will transition to lovenox prior to procedure - need another foot surgery, not scheduled advisesd to call when she knows   #Vertebral Artery dissection - Follows with [facility] at [facility] Dr. Mcgee - Multiple requests made for medical records  #Health Maintenance  - Mammo/Sono: 3/2025 - CNY: due 2026  - MRI/MRCP 9/2023 Stable 4 mm pancreatic cystic lesion since 05/10/2021.Continued surveillance imaging recommended with follow-up MRI/MRCP in 2 years. Due 9/2025 - DEXA: 1/2025 - CT Chest 4/2025  LAB in 4 weeks with possible Zoladex LAB in 8 weeks with possible Zoladex RTC in 12 weeks with CBC with diff, CMP, vit D, ESR/CRP, Mag, estradiol, LH, FSH.possible zoladex   case and management discussed with dr jaime